# Patient Record
Sex: FEMALE | Race: WHITE | NOT HISPANIC OR LATINO | Employment: FULL TIME | URBAN - METROPOLITAN AREA
[De-identification: names, ages, dates, MRNs, and addresses within clinical notes are randomized per-mention and may not be internally consistent; named-entity substitution may affect disease eponyms.]

---

## 2017-01-16 ENCOUNTER — OFFICE VISIT (OUTPATIENT)
Dept: FAMILY MEDICINE CLINIC | Facility: CLINIC | Age: 62
End: 2017-01-16

## 2017-01-16 VITALS
HEART RATE: 89 BPM | DIASTOLIC BLOOD PRESSURE: 76 MMHG | BODY MASS INDEX: 30.51 KG/M2 | SYSTOLIC BLOOD PRESSURE: 140 MMHG | HEIGHT: 63 IN | OXYGEN SATURATION: 92 % | RESPIRATION RATE: 18 BRPM | TEMPERATURE: 98 F | WEIGHT: 172.2 LBS

## 2017-01-16 DIAGNOSIS — J20.9 ACUTE BRONCHITIS, UNSPECIFIED ORGANISM: Primary | ICD-10-CM

## 2017-01-16 DIAGNOSIS — J06.9 ACUTE URI: ICD-10-CM

## 2017-01-16 PROCEDURE — 99213 OFFICE O/P EST LOW 20 MIN: CPT | Performed by: NURSE PRACTITIONER

## 2017-01-16 RX ORDER — DEXTROMETHORPHAN HYDROBROMIDE AND PROMETHAZINE HYDROCHLORIDE 15; 6.25 MG/5ML; MG/5ML
5 SYRUP ORAL 4 TIMES DAILY PRN
Qty: 180 ML | Refills: 0 | Status: SHIPPED | OUTPATIENT
Start: 2017-01-16 | End: 2018-05-09

## 2017-01-16 RX ORDER — METHYLPREDNISOLONE 4 MG/1
TABLET ORAL
Qty: 21 TABLET | Refills: 0 | Status: SHIPPED | OUTPATIENT
Start: 2017-01-16 | End: 2018-05-09

## 2017-01-16 RX ORDER — ALBUTEROL SULFATE 90 UG/1
2 AEROSOL, METERED RESPIRATORY (INHALATION) EVERY 4 HOURS PRN
Qty: 1 INHALER | Refills: 6 | Status: SHIPPED | OUTPATIENT
Start: 2017-01-16 | End: 2018-09-17

## 2017-01-16 RX ORDER — AZITHROMYCIN 250 MG/1
TABLET, FILM COATED ORAL
Qty: 6 TABLET | Refills: 0 | Status: SHIPPED | OUTPATIENT
Start: 2017-01-16 | End: 2018-05-09

## 2017-01-16 NOTE — PROGRESS NOTES
Subjective   Kathleen William is a 61 y.o. female.   Chief Complaint   Patient presents with   • URI     since saturday sunday , PT STATES PROGRESSIVELY GETTING WORSE    • Cough     since friday      Vitals:    01/16/17 1057   BP: 140/76   Pulse: 89   Resp: 18   Temp: 98 °F (36.7 °C)   SpO2: 92%     No LMP recorded.    History of Present Illness  Kathleen is a patient Of Dr Chi who is here for an an acute visit. She c/o chest congestion with small amounts of sputum, wheezing, nasal congestion and post nasal drainage for 2 days. She did get her flu vaccine. She denies fever, chills or body aches. She has taken otc medications with little relief.     The following portions of the patient's history were reviewed and updated as appropriate: allergies, current medications, past family history, past medical history, past social history, past surgical history and problem list.    Review of Systems   Constitutional: Negative for chills, fatigue and fever.   HENT: Positive for congestion. Negative for ear pain, postnasal drip, rhinorrhea, sinus pressure and sore throat.    Respiratory: Positive for cough and wheezing. Negative for chest tightness and shortness of breath.    Cardiovascular: Negative.        Objective   Physical Exam   Constitutional: Vital signs are normal. She appears well-developed and well-nourished. She appears ill. No distress.   HENT:   Right Ear: Tympanic membrane and ear canal normal.   Left Ear: Tympanic membrane and ear canal normal.   Nose: Mucosal edema and rhinorrhea present.   Mouth/Throat: Posterior oropharyngeal erythema (post nasal drainage noted in the posterior pharynx ) present.   Cardiovascular: Normal rate, regular rhythm and normal heart sounds.    Pulmonary/Chest: Effort normal. She has wheezes (few scattered expiratory wheezes, posteriorly ). She has no rhonchi. She has no rales.   Neurological: She is alert.   Skin: Skin is warm and dry.       Assessment/Plan   Kathleen was seen  today for uri and cough.    Diagnoses and all orders for this visit:    Acute bronchitis, unspecified organism    Acute URI    Other orders  -     MethylPREDNISolone (MEDROL, PRAKASH,) 4 MG tablet; Take as directed on package instructions.  -     azithromycin (ZITHROMAX Z-PRAKASH) 250 MG tablet; Take 2 tablets the first day, then 1 tablet daily for 4 days.  -     albuterol (PROAIR HFA) 108 (90 BASE) MCG/ACT inhaler; Inhale 2 puffs Every 4 (Four) Hours As Needed for wheezing or shortness of air.  -     promethazine-dextromethorphan (PROMETHAZINE-DM) 6.25-15 MG/5ML syrup; Take 5 mL by mouth 4 (Four) Times a Day As Needed for cough.    Symptom treatment for 7-10 days, I gave her a zpak to hold on to incase her symptoms persisted or worsened.   Refilled her albuterol  Rest and fluids  Tylenol or motrin   Avoid second hand smoke and allergens   No driving while taking promethazine DM  Throat lozenges, humidifier, vicks vapor rub as needed  Follow up if your symptoms persist or sooner if your symptoms worsen or if you develop new symptoms

## 2017-01-16 NOTE — MR AVS SNAPSHOT
Kathleen Lisatyra   1/16/2017 11:00 AM   Office Visit    Dept Phone:  [phone]   Encounter #:  [id number]    Provider:  AUDRA Greenberg   Department:  [facility]                Your Full Care Plan              Today's Medication Changes          These changes are accurate as of: 1/16/17 11:10 AM.  If you have any questions, ask your nurse or doctor.               New Medication(s)Ordered:     azithromycin 250 MG tablet   Commonly known as:  ZITHROMAX Z-PRAKASH   Take 2 tablets the first day, then 1 tablet daily for 4 days.   Started by:  AUDRA Greenberg       MethylPREDNISolone 4 MG tablet   Commonly known as:  MEDROL (PRAKASH)   Take as directed on package instructions.   Started by:  AUDRA Greenberg       promethazine-dextromethorphan 6.25-15 MG/5ML syrup   Commonly known as:  PROMETHAZINE-DM   Take 5 mL by mouth 4 (Four) Times a Day As Needed for cough.   Started by:  AUDRA Greenberg         Stop taking medication(s)listed here:     FLUVIRIN suspension injection   Generic drug:  Influenza Vac Typ A&B Surf Ant   Stopped by:  Lorenza Ulloa MA                Where to Get Your Medications      These medications were sent to [facility]/pharmacy #[id number] [place], [place] - [address] [address] AT [address] - [phone]  - [phone]   [address] [address], [facility] [address]     Phone:  [phone]     albuterol 108 (90 BASE) MCG/ACT inhaler    MethylPREDNISolone 4 MG tablet    promethazine-dextromethorphan 6.25-15 MG/5ML syrup         You can get these medications from any pharmacy     Bring a paper prescription for each of these medications     azithromycin 250 MG tablet                  Your Updated Medication List          This list is accurate as of: 1/16/17 11:10 AM.  Always use your most recent med list.                albuterol 108 (90 BASE) MCG/ACT inhaler   Commonly known as:  PROAIR HFA   Inhale  2 puffs Every 4 (Four) Hours As Needed for wheezing or shortness of air.       azithromycin 250 MG tablet   Commonly known as:  ZITHROMAX Z-PRAKASH   Take 2 tablets the first day, then 1 tablet daily for 4 days.       MethylPREDNISolone 4 MG tablet   Commonly known as:  MEDROL (PRAKASH)   Take as directed on package instructions.       promethazine-dextromethorphan 6.25-15 MG/5ML syrup   Commonly known as:  PROMETHAZINE-DM   Take 5 mL by mouth 4 (Four) Times a Day As Needed for cough.       rosuvastatin 40 MG tablet   Commonly known as:  CRESTOR   Take 1 tablet by mouth Daily.       SYNTHROID 150 MCG tablet   Generic drug:  levothyroxine   Take 1 tablet by mouth Daily.       traMADol 50 MG tablet   Commonly known as:  ULTRAM       varenicline 0.5 MG X 11 & 1 MG X 42 tablet   Commonly known as:  CHANTIX STARTING MONTH PRAKASH   Take 0.5 mg one daily on days 1-2 and 0.5 mg twice daily on days 4-7. Then 1 mg twice daily for a total of 12 weeks.               You Were Diagnosed With        Codes Comments    Acute bronchitis, unspecified organism    -  Primary ICD-10-CM: J20.9  ICD-9-CM: 466.0     Acute URI     ICD-10-CM: J06.9  ICD-9-CM: 465.9       Instructions     None    Patient Instructions History      Upcoming Appointments     Visit Type Date Time Department    OFFICE VISIT 2017 11:00 AM [facility]    [facility] 2017 11:00 AM [facility]    OFFICE VISIT 2017  1:15 PM [facility]     [employer] allows you to send messages to your doctor, view your test results, renew your prescriptions, schedule appointments, and more. To sign up, go to [employer] and click on the Sign Up Now link in the New User? box. Enter your [employer] Activation Code exactly as it appears below along with the last four digits of your Social Security Number and your Date of Birth () to complete the sign-up process. If you do not sign up before the expiration date, you must  "request a new code.    [employer] Activation Code: [email or web address]  Expires: 1/30/2017 11:10 AM    If you have questions, you can email [email or web address] or call [phone] to talk to our [employer] staff. Remember, [employer] is NOT to be used for urgent needs. For medical emergencies, dial 911.               Other Info from Your Visit           Your Appointments     Apr 21, 2017 11:00 AM EDT   [facility] with [facility] (--)    [address] [address] [address] [phone]            Apr 28, 2017  1:15 PM EDT   Office Visit with Bay Chi MD   [facility] (--)    [address] [address] [address] [phone]           Arrive 15 minutes prior to appointment.              Allergies     Codeine        Reason for Visit     URI since saturday sunday , PT STATES PROGRESSIVELY GETTING WORSE     Cough since friday       Vital Signs     Blood Pressure Pulse Temperature Respirations Height Weight    140/76 89 98 °F (36.7 °C) (Oral) 18 63\" (160 cm) 172 lb 3.2 oz (78.1 kg)    Oxygen Saturation Body Mass Index Smoking Status             92% 30.5 kg/m2 Current Every Day Smoker         Problems and Diagnoses Noted     Acute bronchitis    -  Primary    Acute upper respiratory infection            "

## 2017-04-13 DIAGNOSIS — Z11.59 NEED FOR HEPATITIS C SCREENING TEST: Primary | ICD-10-CM

## 2017-04-13 DIAGNOSIS — E78.5 HYPERLIPIDEMIA, UNSPECIFIED HYPERLIPIDEMIA TYPE: ICD-10-CM

## 2017-04-13 DIAGNOSIS — R73.9 HYPERGLYCEMIA: ICD-10-CM

## 2017-04-13 DIAGNOSIS — E03.9 ACQUIRED HYPOTHYROIDISM: ICD-10-CM

## 2018-05-09 ENCOUNTER — APPOINTMENT (OUTPATIENT)
Dept: CT IMAGING | Facility: HOSPITAL | Age: 63
End: 2018-05-09

## 2018-05-09 ENCOUNTER — HOSPITAL ENCOUNTER (EMERGENCY)
Facility: HOSPITAL | Age: 63
Discharge: HOME | End: 2018-05-09
Attending: EMERGENCY MEDICINE | Admitting: EMERGENCY MEDICINE

## 2018-05-09 VITALS
RESPIRATION RATE: 16 BRPM | HEART RATE: 70 BPM | DIASTOLIC BLOOD PRESSURE: 68 MMHG | SYSTOLIC BLOOD PRESSURE: 129 MMHG | BODY MASS INDEX: 27.46 KG/M2 | OXYGEN SATURATION: 92 % | HEIGHT: 63 IN | WEIGHT: 155 LBS | TEMPERATURE: 97.9 F

## 2018-05-09 DIAGNOSIS — N20.0 KIDNEY STONE: Primary | ICD-10-CM

## 2018-05-09 LAB
ALBUMIN SERPL-MCNC: 4.5 G/DL (ref 3.5–5.2)
ALBUMIN/GLOB SERPL: 2 G/DL
ALP SERPL-CCNC: 78 U/L (ref 39–117)
ALT SERPL W P-5'-P-CCNC: 31 U/L (ref 1–33)
ANION GAP SERPL CALCULATED.3IONS-SCNC: 14.1 MMOL/L
AST SERPL-CCNC: 29 U/L (ref 1–32)
BACTERIA UR QL AUTO: ABNORMAL /HPF
BASOPHILS # BLD AUTO: 0.06 10*3/MM3 (ref 0–0.2)
BASOPHILS NFR BLD AUTO: 0.6 % (ref 0–1.5)
BILIRUB SERPL-MCNC: 0.3 MG/DL (ref 0.1–1.2)
BILIRUB UR QL STRIP: NEGATIVE
BUN BLD-MCNC: 13 MG/DL (ref 8–23)
BUN/CREAT SERPL: 20 (ref 7–25)
CALCIUM SPEC-SCNC: 9.5 MG/DL (ref 8.6–10.5)
CHLORIDE SERPL-SCNC: 105 MMOL/L (ref 98–107)
CLARITY UR: ABNORMAL
CO2 SERPL-SCNC: 24.9 MMOL/L (ref 22–29)
COLOR UR: ABNORMAL
CREAT BLD-MCNC: 0.65 MG/DL (ref 0.57–1)
DEPRECATED RDW RBC AUTO: 45.1 FL (ref 37–54)
EOSINOPHIL # BLD AUTO: 0.14 10*3/MM3 (ref 0–0.7)
EOSINOPHIL NFR BLD AUTO: 1.3 % (ref 0.3–6.2)
ERYTHROCYTE [DISTWIDTH] IN BLOOD BY AUTOMATED COUNT: 13 % (ref 11.7–13)
GFR SERPL CREATININE-BSD FRML MDRD: 93 ML/MIN/1.73
GLOBULIN UR ELPH-MCNC: 2.2 GM/DL
GLUCOSE BLD-MCNC: 123 MG/DL (ref 65–99)
GLUCOSE UR STRIP-MCNC: NEGATIVE MG/DL
HCT VFR BLD AUTO: 49.2 % (ref 35.6–45.5)
HGB BLD-MCNC: 16.4 G/DL (ref 11.9–15.5)
HGB UR QL STRIP.AUTO: ABNORMAL
HYALINE CASTS UR QL AUTO: ABNORMAL /LPF
IMM GRANULOCYTES # BLD: 0.02 10*3/MM3 (ref 0–0.03)
IMM GRANULOCYTES NFR BLD: 0.2 % (ref 0–0.5)
INR PPP: 1 (ref 0.9–1.1)
KETONES UR QL STRIP: NEGATIVE
LEUKOCYTE ESTERASE UR QL STRIP.AUTO: ABNORMAL
LYMPHOCYTES # BLD AUTO: 3.45 10*3/MM3 (ref 0.9–4.8)
LYMPHOCYTES NFR BLD AUTO: 31.9 % (ref 19.6–45.3)
MCH RBC QN AUTO: 31.8 PG (ref 26.9–32)
MCHC RBC AUTO-ENTMCNC: 33.3 G/DL (ref 32.4–36.3)
MCV RBC AUTO: 95.5 FL (ref 80.5–98.2)
MONOCYTES # BLD AUTO: 0.67 10*3/MM3 (ref 0.2–1.2)
MONOCYTES NFR BLD AUTO: 6.2 % (ref 5–12)
NEUTROPHILS # BLD AUTO: 6.47 10*3/MM3 (ref 1.9–8.1)
NEUTROPHILS NFR BLD AUTO: 59.8 % (ref 42.7–76)
NITRITE UR QL STRIP: NEGATIVE
PH UR STRIP.AUTO: <=5 [PH] (ref 5–8)
PLATELET # BLD AUTO: 241 10*3/MM3 (ref 140–500)
PMV BLD AUTO: 11.1 FL (ref 6–12)
POTASSIUM BLD-SCNC: 3.6 MMOL/L (ref 3.5–5.2)
PROT SERPL-MCNC: 6.7 G/DL (ref 6–8.5)
PROT UR QL STRIP: ABNORMAL
PROTHROMBIN TIME: 13 SECONDS (ref 11.7–14.2)
RBC # BLD AUTO: 5.15 10*6/MM3 (ref 3.9–5.2)
RBC # UR: ABNORMAL /HPF
REF LAB TEST METHOD: ABNORMAL
SODIUM BLD-SCNC: 144 MMOL/L (ref 136–145)
SP GR UR STRIP: 1.02 (ref 1–1.03)
SQUAMOUS #/AREA URNS HPF: ABNORMAL /HPF
UROBILINOGEN UR QL STRIP: ABNORMAL
WBC NRBC COR # BLD: 10.81 10*3/MM3 (ref 4.5–10.7)
WBC UR QL AUTO: ABNORMAL /HPF

## 2018-05-09 PROCEDURE — 85610 PROTHROMBIN TIME: CPT | Performed by: EMERGENCY MEDICINE

## 2018-05-09 PROCEDURE — 99284 EMERGENCY DEPT VISIT MOD MDM: CPT

## 2018-05-09 PROCEDURE — 25010000002 ONDANSETRON PER 1 MG: Performed by: EMERGENCY MEDICINE

## 2018-05-09 PROCEDURE — 85025 COMPLETE CBC W/AUTO DIFF WBC: CPT | Performed by: EMERGENCY MEDICINE

## 2018-05-09 PROCEDURE — 96375 TX/PRO/DX INJ NEW DRUG ADDON: CPT

## 2018-05-09 PROCEDURE — 80053 COMPREHEN METABOLIC PANEL: CPT | Performed by: EMERGENCY MEDICINE

## 2018-05-09 PROCEDURE — 87086 URINE CULTURE/COLONY COUNT: CPT | Performed by: EMERGENCY MEDICINE

## 2018-05-09 PROCEDURE — 96361 HYDRATE IV INFUSION ADD-ON: CPT

## 2018-05-09 PROCEDURE — 25010000002 HYDROMORPHONE PER 4 MG: Performed by: EMERGENCY MEDICINE

## 2018-05-09 PROCEDURE — 96374 THER/PROPH/DIAG INJ IV PUSH: CPT

## 2018-05-09 PROCEDURE — 81001 URINALYSIS AUTO W/SCOPE: CPT | Performed by: EMERGENCY MEDICINE

## 2018-05-09 PROCEDURE — 74176 CT ABD & PELVIS W/O CONTRAST: CPT

## 2018-05-09 RX ORDER — HYDROCODONE BITARTRATE AND ACETAMINOPHEN 5; 325 MG/1; MG/1
1 TABLET ORAL EVERY 4 HOURS PRN
Qty: 15 TABLET | Refills: 0 | Status: SHIPPED | OUTPATIENT
Start: 2018-05-09 | End: 2018-09-17

## 2018-05-09 RX ORDER — HYDROMORPHONE HYDROCHLORIDE 1 MG/ML
0.5 INJECTION, SOLUTION INTRAMUSCULAR; INTRAVENOUS; SUBCUTANEOUS ONCE
Status: COMPLETED | OUTPATIENT
Start: 2018-05-09 | End: 2018-05-09

## 2018-05-09 RX ORDER — ONDANSETRON 4 MG/1
4 TABLET, FILM COATED ORAL EVERY 8 HOURS PRN
Qty: 10 TABLET | Refills: 0 | Status: SHIPPED | OUTPATIENT
Start: 2018-05-09 | End: 2018-09-17

## 2018-05-09 RX ORDER — ONDANSETRON 2 MG/ML
4 INJECTION INTRAMUSCULAR; INTRAVENOUS ONCE
Status: COMPLETED | OUTPATIENT
Start: 2018-05-09 | End: 2018-05-09

## 2018-05-09 RX ORDER — SODIUM CHLORIDE 0.9 % (FLUSH) 0.9 %
10 SYRINGE (ML) INJECTION AS NEEDED
Status: DISCONTINUED | OUTPATIENT
Start: 2018-05-09 | End: 2018-05-09 | Stop reason: HOSPADM

## 2018-05-09 RX ORDER — TAMSULOSIN HYDROCHLORIDE 0.4 MG/1
1 CAPSULE ORAL NIGHTLY
Qty: 10 CAPSULE | Refills: 0 | Status: SHIPPED | OUTPATIENT
Start: 2018-05-09 | End: 2018-09-17

## 2018-05-09 RX ORDER — SODIUM CHLORIDE 9 MG/ML
125 INJECTION, SOLUTION INTRAVENOUS CONTINUOUS
Status: DISCONTINUED | OUTPATIENT
Start: 2018-05-09 | End: 2018-05-09 | Stop reason: HOSPADM

## 2018-05-09 RX ADMIN — ONDANSETRON 4 MG: 2 INJECTION INTRAMUSCULAR; INTRAVENOUS at 02:13

## 2018-05-09 RX ADMIN — SODIUM CHLORIDE 125 ML/HR: 9 INJECTION, SOLUTION INTRAVENOUS at 03:05

## 2018-05-09 RX ADMIN — HYDROMORPHONE HYDROCHLORIDE 0.5 MG: 1 INJECTION, SOLUTION INTRAMUSCULAR; INTRAVENOUS; SUBCUTANEOUS at 02:16

## 2018-05-09 NOTE — ED PROVIDER NOTES
EMERGENCY DEPARTMENT ENCOUNTER    CHIEF COMPLAINT  Chief Complaint: Abdominal pain  History given by: patient   History limited by: n/a  Room Number: 04/04  PMD: Amairani Moura MD      HPI:  Pt is a 62 y.o. female who presents complaining of RLQ abd pain that began tonight. Pt states that she had mild pain prior to going to bed, but awoke with more severe pain. Pt states that the pain radiates into her back and is worsened by movement. She denies urinary sx, fever, chills, or any other sx. Hx of cholecystectomy.     Duration:  6 hours  Onset: gradual  Timing: constant  Location: RLQ  Radiation: into the back   Quality: pain  Intensity/Severity: moderate  Progression: worsened   Associated Symptoms: none  Aggravating Factors: movement   Alleviating Factors: none  Previous Episodes: none  Treatment before arrival: none    PAST MEDICAL HISTORY  Active Ambulatory Problems     Diagnosis Date Noted   • Disease of respiratory system 08/10/2016   • Hyperglycemia 08/10/2016   • Hyperlipidemia 08/10/2016   • Hypothyroidism 08/10/2016   • Abnormal LFTs (liver function tests) 08/12/2016   • Erythrocytosis 12/16/2016     Resolved Ambulatory Problems     Diagnosis Date Noted   • No Resolved Ambulatory Problems     Past Medical History:   Diagnosis Date   • Disease of thyroid gland    • Edema    • Hyperlipidemia    • Neck pain        PAST SURGICAL HISTORY  Past Surgical History:   Procedure Laterality Date   • TOE SURGERY         FAMILY HISTORY  History reviewed. No pertinent family history.    SOCIAL HISTORY  Social History     Social History   • Marital status: Unknown     Spouse name: N/A   • Number of children: N/A   • Years of education: N/A     Occupational History   • Not on file.     Social History Main Topics   • Smoking status: Current Every Day Smoker     Packs/day: 1.50   • Smokeless tobacco: Not on file   • Alcohol use Yes   • Drug use: Unknown   • Sexual activity: Defer     Other Topics Concern   • Not on file      Social History Narrative   • No narrative on file       ALLERGIES  Codeine    REVIEW OF SYSTEMS  Review of Systems   Constitutional: Negative for fever.   HENT: Negative for sore throat.    Eyes: Negative.    Respiratory: Negative for cough and shortness of breath.    Cardiovascular: Negative for chest pain.   Gastrointestinal: Positive for abdominal pain. Negative for diarrhea and vomiting.   Genitourinary: Negative for dysuria.   Musculoskeletal: Negative for neck pain.   Skin: Negative for rash.   Allergic/Immunologic: Negative.    Neurological: Negative for weakness, numbness and headaches.   Hematological: Negative.    Psychiatric/Behavioral: Negative.    All other systems reviewed and are negative.      PHYSICAL EXAM  ED Triage Vitals [05/09/18 0147]   Temp Heart Rate Resp BP SpO2   97.9 °F (36.6 °C) 77 20 -- 93 %      Temp src Heart Rate Source Patient Position BP Location FiO2 (%)   -- -- -- -- --       Physical Exam   Constitutional: She is oriented to person, place, and time and well-developed, well-nourished, and in no distress. No distress.   HENT:   Head: Normocephalic and atraumatic.   Eyes: EOM are normal. Pupils are equal, round, and reactive to light.   Neck: Normal range of motion. Neck supple.   Cardiovascular: Normal rate, regular rhythm and normal heart sounds.    Pulmonary/Chest: Effort normal and breath sounds normal. No respiratory distress.   Abdominal: Soft. There is tenderness (marked) in the right lower quadrant. There is guarding (voluntary). There is no rebound.   Increased pain with heel tap.   Musculoskeletal: Normal range of motion. She exhibits no edema.   Neurological: She is alert and oriented to person, place, and time.   Skin: Skin is warm and dry. No rash noted.   Psychiatric: Mood and affect normal.   Nursing note and vitals reviewed.      LAB RESULTS  Lab Results (last 24 hours)     Procedure Component Value Units Date/Time    CBC & Differential [[id number]] Collected:   05/09/18 0209    Specimen:  Blood Updated:  05/09/18 0220    Narrative:       The following orders were created for panel order CBC & Differential.  Procedure                               Abnormality         Status                     ---------                               -----------         ------                     CBC Auto Differential[[id number]]        Abnormal            Final result                 Please view results for these tests on the individual orders.    Comprehensive Metabolic Panel [[id number]]  (Abnormal) Collected:  05/09/18 0209    Specimen:  Blood Updated:  05/09/18 0240     Glucose 123 (H) mg/dL      BUN 13 mg/dL      Creatinine 0.65 mg/dL      Sodium 144 mmol/L      Potassium 3.6 mmol/L      Chloride 105 mmol/L      CO2 24.9 mmol/L      Calcium 9.5 mg/dL      Total Protein 6.7 g/dL      Albumin 4.50 g/dL      ALT (SGPT) 31 U/L      AST (SGOT) 29 U/L      Alkaline Phosphatase 78 U/L      Total Bilirubin 0.3 mg/dL      eGFR Non African Amer 93 mL/min/1.73      Globulin 2.2 gm/dL      A/G Ratio 2.0 g/dL      BUN/Creatinine Ratio 20.0     Anion Gap 14.1 mmol/L     Protime-INR [[id number]]  (Normal) Collected:  05/09/18 0209    Specimen:  Blood Updated:  05/09/18 0229     Protime 13.0 Seconds      INR 1.00    CBC Auto Differential [[id number]]  (Abnormal) Collected:  05/09/18 0209    Specimen:  Blood Updated:  05/09/18 0220     WBC 10.81 (H) 10*3/mm3      RBC 5.15 10*6/mm3      Hemoglobin 16.4 (H) g/dL      Hematocrit 49.2 (H) %      MCV 95.5 fL      MCH 31.8 pg      MCHC 33.3 g/dL      RDW 13.0 %      RDW-SD 45.1 fl      MPV 11.1 fL      Platelets 241 10*3/mm3      Neutrophil % 59.8 %      Lymphocyte % 31.9 %      Monocyte % 6.2 %      Eosinophil % 1.3 %      Basophil % 0.6 %      Immature Grans % 0.2 %      Neutrophils, Absolute 6.47 10*3/mm3      Lymphocytes, Absolute 3.45 10*3/mm3      Monocytes, Absolute 0.67 10*3/mm3      Eosinophils, Absolute 0.14 10*3/mm3      Basophils, Absolute 0.06  10*3/mm3      Immature Grans, Absolute 0.02 10*3/mm3     Urinalysis With / Culture If Indicated - Urine, Clean Catch [[id number]]  (Abnormal) Collected:  05/09/18 0315    Specimen:  Urine from Urine, Clean Catch Updated:  05/09/18 0335     Color, UA Zuri (A)     Appearance, UA Cloudy (A)     pH, UA <=5.0     Specific Gravity, UA 1.025     Glucose, UA Negative     Ketones, UA Negative     Bilirubin, UA Negative     Blood, UA Large (3+) (A)     Protein, UA 30 mg/dL (1+) (A)     Leuk Esterase, UA Small (1+) (A)     Nitrite, UA Negative     Urobilinogen, UA 1.0 E.U./dL    Urinalysis, Microscopic Only - Urine, Clean Catch [[id number]]  (Abnormal) Collected:  05/09/18 0315    Specimen:  Urine from Urine, Clean Catch Updated:  05/09/18 0336     RBC, UA Too Numerous to Count (A) /HPF      WBC, UA 6-12 (A) /HPF      Bacteria, UA None Seen /HPF      Squamous Epithelial Cells, UA 0-2 /HPF      Hyaline Casts, UA 0-2 /LPF      Methodology Automated Microscopy    Urine Culture - Urine, Urine, Clean Catch [[id number]] Collected:  05/09/18 0315    Specimen:  Urine from Urine, Clean Catch Updated:  05/09/18 0336          I ordered the above labs and reviewed the results    RADIOLOGY  CT Abdomen Pelvis Without Contrast   Preliminary Result   0.4 cm stone obstructs the right UVJ, mild right hydronephrosis and   hydroureter.                          I ordered the above noted radiological studies. Interpreted by radiologist.. Reviewed by me in PACS.       PROCEDURES  Procedures      PROGRESS AND CONSULTS  ED Course     01:54   HR- 77 Temp- 97.9 °F (36.6 °C) O2 sat- 93%  Advised pt of the plan for labs and CT abd/pelvis. Will treat pain. Pt understands and agrees with the plan, all questions answered.    01:56  IVF ordered for hydration. Dilaudid and Zofran ordered to treat pain. CT abd/pelvis, CMP, CBC, PT/INR, and UA ordered.     03;26  BP- 144/96 HR- 77 Temp- 97.9 °F (36.6 °C) O2 sat- 93%  Rechecked the patient who is in NAD and is  resting comfortably. Advised pt that the CT shows a 4 mm right UVJ stone. Awaiting UA results. Pt understands and agrees with the plan, all questions answered.    04:00  BP- 144/96 HR- 77 Temp- 97.9 °F (36.6 °C) O2 sat- 93%  Rechecked the patient who is in NAD and is resting comfortably. Advised pt that the UA shows RBC's, but no UTI. Plan for discharge with rx for pain medication and referral to urology for f/u. Pt understands and agrees with the plan, all questions answered.    MEDICAL DECISION MAKING  Results were reviewed/discussed with the patient and they were also made aware of online access. Pt also made aware that some labs, such as cultures, will not be resulted during ER visit and follow up with PMD is necessary.     MDM  Number of Diagnoses or Management Options     Amount and/or Complexity of Data Reviewed  Clinical lab tests: ordered and reviewed (Urinalysis- RBC too numerous to count, WBC 6-12, Bacteria none seen  )  Tests in the radiology section of CPT®: ordered and reviewed (CT abd/pelvis shows 4 mm right UVJ stone)    Patient Progress  Patient progress: stable         DIAGNOSIS  Final diagnoses:   Kidney stone       DISPOSITION  DISCHARGE    Patient discharged in stable condition.    Reviewed implications of results, diagnosis, meds, responsibility to follow up, warning signs and symptoms of possible worsening, potential complications and reasons to return to ER.    Patient/Family voiced understanding of above instructions.    Discussed plan for discharge, as there is no emergent indication for admission. Patient referred to primary care provider for BP management due to today's BP. Pt/family is agreeable and understands need for follow up and repeat testing.  Pt is aware that discharge does not mean that nothing is wrong but it indicates no emergency is present that requires admission and they must continue care with follow-up as given below or physician of their choice.     FOLLOW-UP  [facility]  [address] [facility] [address]  [phone]  Schedule an appointment as soon as possible for a visit            Medication List      New Prescriptions    HYDROcodone-acetaminophen 5-325 MG per tablet  Commonly known as:  NORCO  Take 1 tablet by mouth Every 4 (Four) Hours As Needed for Moderate Pain .     ondansetron 4 MG tablet  Commonly known as:  ZOFRAN  Take 1 tablet by mouth Every 8 (Eight) Hours As Needed for Nausea.     tamsulosin 0.4 MG capsule 24 hr capsule  Commonly known as:  FLOMAX  Take 1 capsule by mouth Every Night.        Stop    azithromycin 250 MG tablet  Commonly known as:  ZITHROMAX Z-PRAKASH     MethylPREDNISolone 4 MG tablet  Commonly known as:  MEDROL (PRAKASH)     promethazine-dextromethorphan 6.25-15 MG/5ML syrup  Commonly known as:  PROMETHAZINE-DM     traMADol 50 MG tablet  Commonly known as:  ULTRAM          Latest Documented Vital Signs:  As of 4:01 AM  BP- 144/96 HR- 77 Temp- 97.9 °F (36.6 °C) O2 sat- 93%    --  Documentation assistance provided by steve Littlejohn for Dr Borrego.  Information recorded by the steve was done at my direction and has been verified and validated by me.       Ayse Littlejohn  05/09/18 0401       Juan Borrego MD  05/09/18 5051

## 2018-05-09 NOTE — ED NOTES
"Pt to bed 04 c/o RLQ abdominal pain. reports pain began today, states \"it woke me up\" reports nausea but denies vomiting. Describes pain as sharp. Pt reports LBM 5/8/18 around 3pm. States \"i tried to go this morning to see if it helped with pain\"    Reports she took 800 mg ibuprofen at home without improvement.       Clarissa Russ RN  05/09/18 0230    "

## 2018-05-10 LAB — BACTERIA SPEC AEROBE CULT: NO GROWTH

## 2018-09-17 ENCOUNTER — APPOINTMENT (OUTPATIENT)
Dept: GENERAL RADIOLOGY | Facility: HOSPITAL | Age: 63
End: 2018-09-17

## 2018-09-17 ENCOUNTER — HOSPITAL ENCOUNTER (INPATIENT)
Facility: HOSPITAL | Age: 63
LOS: 3 days | Discharge: HOME | End: 2018-09-20
Attending: EMERGENCY MEDICINE | Admitting: HOSPITALIST

## 2018-09-17 DIAGNOSIS — J96.01 ACUTE RESPIRATORY FAILURE WITH HYPOXIA (HCC): ICD-10-CM

## 2018-09-17 DIAGNOSIS — G47.33 OSA (OBSTRUCTIVE SLEEP APNEA): ICD-10-CM

## 2018-09-17 DIAGNOSIS — J44.1 COPD EXACERBATION (HCC): Primary | ICD-10-CM

## 2018-09-17 LAB
ALBUMIN SERPL-MCNC: 5.1 G/DL (ref 3.5–5.2)
ALBUMIN/GLOB SERPL: 2.2 G/DL
ALP SERPL-CCNC: 104 U/L (ref 39–117)
ALT SERPL W P-5'-P-CCNC: 74 U/L (ref 1–33)
ANION GAP SERPL CALCULATED.3IONS-SCNC: 13.1 MMOL/L
AST SERPL-CCNC: 33 U/L (ref 1–32)
BASOPHILS # BLD AUTO: 0.01 10*3/MM3 (ref 0–0.2)
BASOPHILS NFR BLD AUTO: 0 % (ref 0–1.5)
BILIRUB SERPL-MCNC: 0.3 MG/DL (ref 0.1–1.2)
BUN BLD-MCNC: 16 MG/DL (ref 8–23)
BUN/CREAT SERPL: 26.2 (ref 7–25)
CALCIUM SPEC-SCNC: 9.5 MG/DL (ref 8.6–10.5)
CHLORIDE SERPL-SCNC: 99 MMOL/L (ref 98–107)
CO2 SERPL-SCNC: 24.9 MMOL/L (ref 22–29)
CREAT BLD-MCNC: 0.61 MG/DL (ref 0.57–1)
DEPRECATED RDW RBC AUTO: 46.8 FL (ref 37–54)
EOSINOPHIL # BLD AUTO: 0 10*3/MM3 (ref 0–0.7)
EOSINOPHIL NFR BLD AUTO: 0 % (ref 0.3–6.2)
ERYTHROCYTE [DISTWIDTH] IN BLOOD BY AUTOMATED COUNT: 13.8 % (ref 11.7–13)
GFR SERPL CREATININE-BSD FRML MDRD: 99 ML/MIN/1.73
GLOBULIN UR ELPH-MCNC: 2.3 GM/DL
GLUCOSE BLD-MCNC: 138 MG/DL (ref 65–99)
HCT VFR BLD AUTO: 48.3 % (ref 35.6–45.5)
HGB BLD-MCNC: 16.8 G/DL (ref 11.9–15.5)
HOLD SPECIMEN: NORMAL
HOLD SPECIMEN: NORMAL
IMM GRANULOCYTES # BLD: 0.08 10*3/MM3 (ref 0–0.03)
IMM GRANULOCYTES NFR BLD: 0.4 % (ref 0–0.5)
LYMPHOCYTES # BLD AUTO: 2.31 10*3/MM3 (ref 0.9–4.8)
LYMPHOCYTES NFR BLD AUTO: 10.1 % (ref 19.6–45.3)
MCH RBC QN AUTO: 32.2 PG (ref 26.9–32)
MCHC RBC AUTO-ENTMCNC: 34.8 G/DL (ref 32.4–36.3)
MCV RBC AUTO: 92.7 FL (ref 80.5–98.2)
MONOCYTES # BLD AUTO: 1.13 10*3/MM3 (ref 0.2–1.2)
MONOCYTES NFR BLD AUTO: 5 % (ref 5–12)
NEUTROPHILS # BLD AUTO: 19.33 10*3/MM3 (ref 1.9–8.1)
NEUTROPHILS NFR BLD AUTO: 84.9 % (ref 42.7–76)
NT-PROBNP SERPL-MCNC: 58 PG/ML (ref 5–900)
PLATELET # BLD AUTO: 334 10*3/MM3 (ref 140–500)
PMV BLD AUTO: 10.9 FL (ref 6–12)
POTASSIUM BLD-SCNC: 4.3 MMOL/L (ref 3.5–5.2)
PROCALCITONIN SERPL-MCNC: <0.02 NG/ML (ref 0.1–0.25)
PROT SERPL-MCNC: 7.4 G/DL (ref 6–8.5)
RBC # BLD AUTO: 5.21 10*6/MM3 (ref 3.9–5.2)
SODIUM BLD-SCNC: 137 MMOL/L (ref 136–145)
TROPONIN T SERPL-MCNC: <0.01 NG/ML (ref 0–0.03)
WBC NRBC COR # BLD: 22.78 10*3/MM3 (ref 4.5–10.7)
WHOLE BLOOD HOLD SPECIMEN: NORMAL
WHOLE BLOOD HOLD SPECIMEN: NORMAL

## 2018-09-17 PROCEDURE — 94799 UNLISTED PULMONARY SVC/PX: CPT

## 2018-09-17 PROCEDURE — 84484 ASSAY OF TROPONIN QUANT: CPT | Performed by: PHYSICIAN ASSISTANT

## 2018-09-17 PROCEDURE — 80053 COMPREHEN METABOLIC PANEL: CPT | Performed by: PHYSICIAN ASSISTANT

## 2018-09-17 PROCEDURE — 25010000002 HEPARIN (PORCINE) PER 1000 UNITS: Performed by: INTERNAL MEDICINE

## 2018-09-17 PROCEDURE — 94640 AIRWAY INHALATION TREATMENT: CPT

## 2018-09-17 PROCEDURE — 84145 PROCALCITONIN (PCT): CPT | Performed by: EMERGENCY MEDICINE

## 2018-09-17 PROCEDURE — 83880 ASSAY OF NATRIURETIC PEPTIDE: CPT | Performed by: PHYSICIAN ASSISTANT

## 2018-09-17 PROCEDURE — 93010 ELECTROCARDIOGRAM REPORT: CPT | Performed by: INTERNAL MEDICINE

## 2018-09-17 PROCEDURE — 99285 EMERGENCY DEPT VISIT HI MDM: CPT

## 2018-09-17 PROCEDURE — 93005 ELECTROCARDIOGRAM TRACING: CPT | Performed by: PHYSICIAN ASSISTANT

## 2018-09-17 PROCEDURE — 85025 COMPLETE CBC W/AUTO DIFF WBC: CPT | Performed by: PHYSICIAN ASSISTANT

## 2018-09-17 PROCEDURE — 71045 X-RAY EXAM CHEST 1 VIEW: CPT

## 2018-09-17 PROCEDURE — 25010000002 METHYLPREDNISOLONE PER 125 MG: Performed by: INTERNAL MEDICINE

## 2018-09-17 RX ORDER — IPRATROPIUM BROMIDE AND ALBUTEROL SULFATE 2.5; .5 MG/3ML; MG/3ML
3 SOLUTION RESPIRATORY (INHALATION)
Status: DISCONTINUED | OUTPATIENT
Start: 2018-09-17 | End: 2018-09-20 | Stop reason: HOSPADM

## 2018-09-17 RX ORDER — LEVOTHYROXINE SODIUM 0.15 MG/1
150 TABLET ORAL DAILY
COMMUNITY

## 2018-09-17 RX ORDER — IPRATROPIUM BROMIDE AND ALBUTEROL SULFATE 2.5; .5 MG/3ML; MG/3ML
3 SOLUTION RESPIRATORY (INHALATION) ONCE
Status: COMPLETED | OUTPATIENT
Start: 2018-09-17 | End: 2018-09-17

## 2018-09-17 RX ORDER — ALBUTEROL SULFATE 2.5 MG/3ML
2.5 SOLUTION RESPIRATORY (INHALATION) ONCE
Status: COMPLETED | OUTPATIENT
Start: 2018-09-17 | End: 2018-09-17

## 2018-09-17 RX ORDER — LEVOTHYROXINE SODIUM 0.15 MG/1
150 TABLET ORAL EVERY MORNING
Status: DISCONTINUED | OUTPATIENT
Start: 2018-09-18 | End: 2018-09-20 | Stop reason: HOSPADM

## 2018-09-17 RX ORDER — METHYLPREDNISOLONE SODIUM SUCCINATE 125 MG/2ML
80 INJECTION, POWDER, LYOPHILIZED, FOR SOLUTION INTRAMUSCULAR; INTRAVENOUS EVERY 8 HOURS
Status: DISCONTINUED | OUTPATIENT
Start: 2018-09-17 | End: 2018-09-19

## 2018-09-17 RX ORDER — HEPARIN SODIUM 5000 [USP'U]/ML
5000 INJECTION, SOLUTION INTRAVENOUS; SUBCUTANEOUS EVERY 12 HOURS SCHEDULED
Status: DISCONTINUED | OUTPATIENT
Start: 2018-09-17 | End: 2018-09-18

## 2018-09-17 RX ORDER — SODIUM CHLORIDE 0.9 % (FLUSH) 0.9 %
10 SYRINGE (ML) INJECTION AS NEEDED
Status: DISCONTINUED | OUTPATIENT
Start: 2018-09-17 | End: 2018-09-18

## 2018-09-17 RX ORDER — SODIUM CHLORIDE 0.9 % (FLUSH) 0.9 %
1-10 SYRINGE (ML) INJECTION AS NEEDED
Status: DISCONTINUED | OUTPATIENT
Start: 2018-09-17 | End: 2018-09-20 | Stop reason: HOSPADM

## 2018-09-17 RX ORDER — ACETAMINOPHEN 325 MG/1
650 TABLET ORAL EVERY 4 HOURS PRN
Status: DISCONTINUED | OUTPATIENT
Start: 2018-09-17 | End: 2018-09-20 | Stop reason: HOSPADM

## 2018-09-17 RX ORDER — ROSUVASTATIN CALCIUM 40 MG/1
40 TABLET, COATED ORAL DAILY
Status: DISCONTINUED | OUTPATIENT
Start: 2018-09-18 | End: 2018-09-20 | Stop reason: HOSPADM

## 2018-09-17 RX ORDER — ONDANSETRON 2 MG/ML
4 INJECTION INTRAMUSCULAR; INTRAVENOUS EVERY 6 HOURS PRN
Status: DISCONTINUED | OUTPATIENT
Start: 2018-09-17 | End: 2018-09-20 | Stop reason: HOSPADM

## 2018-09-17 RX ORDER — IBUPROFEN 800 MG/1
800 TABLET ORAL DAILY PRN
COMMUNITY
End: 2018-09-20 | Stop reason: HOSPADM

## 2018-09-17 RX ADMIN — ALBUTEROL SULFATE 2.5 MG: 2.5 SOLUTION RESPIRATORY (INHALATION) at 18:04

## 2018-09-17 RX ADMIN — IPRATROPIUM BROMIDE AND ALBUTEROL SULFATE 3 ML: .5; 3 SOLUTION RESPIRATORY (INHALATION) at 16:58

## 2018-09-17 RX ADMIN — ACETAMINOPHEN 650 MG: 325 TABLET ORAL at 22:28

## 2018-09-17 RX ADMIN — METHYLPREDNISOLONE SODIUM SUCCINATE 80 MG: 125 INJECTION, POWDER, FOR SOLUTION INTRAMUSCULAR; INTRAVENOUS at 23:57

## 2018-09-17 RX ADMIN — HEPARIN SODIUM 5000 UNITS: 5000 INJECTION INTRAVENOUS; SUBCUTANEOUS at 23:58

## 2018-09-17 RX ADMIN — IPRATROPIUM BROMIDE AND ALBUTEROL SULFATE 3 ML: .5; 3 SOLUTION RESPIRATORY (INHALATION) at 23:06

## 2018-09-17 NOTE — ED TRIAGE NOTES
Pt reports she has been soa for a week. Pt has seen her dr twice in a week and was given steroids and antibiotics that pt states is not helping.

## 2018-09-17 NOTE — ED PROVIDER NOTES
EMERGENCY DEPARTMENT ENCOUNTER    CHIEF COMPLAINT  Chief Complaint: SOA  History given by: Pt  History limited by: Nothing  Room Number: 18/18  PMD: Amairani Moura MD      HPI:  Pt is a 63 y.o. female with a h/o COPD who presents complaining of SOA that began 1.5 weeks ago. The pt states that she was working in a wet basement and then worked outside, and since then has felt SOA and has been coughing. The pt has had course of steroids and has had breathing treatment (albuterol with nebulizer) without significant improvement.     The pt states that she quit smoking when her symptoms began.     Duration:  1.5 weeks  Onset: Gradual  Timing: Constant  Location: Chest  Quality: Difficult to breathe  Intensity/Severity: Mdoerate  Progression: No change  Associated Symptoms: Cough   Aggravating Factors: Unknown  Alleviating Factors: Unknown  Treatment before arrival: The pt is on a steroid course and has used breathing treatments without relief.     PAST MEDICAL HISTORY  Active Ambulatory Problems     Diagnosis Date Noted   • Disease of respiratory system 08/10/2016   • Hyperglycemia 08/10/2016   • Hyperlipidemia 08/10/2016   • Hypothyroidism 08/10/2016   • Abnormal LFTs (liver function tests) 08/12/2016   • Erythrocytosis 12/16/2016     Resolved Ambulatory Problems     Diagnosis Date Noted   • No Resolved Ambulatory Problems     Past Medical History:   Diagnosis Date   • Back pain    • Disease of thyroid gland    • Edema    • Hyperlipidemia    • Neck pain        PAST SURGICAL HISTORY  Past Surgical History:   Procedure Laterality Date   • BREAST SURGERY     • CHOLECYSTECTOMY     • HERNIA REPAIR     • HYSTERECTOMY     • TOE SURGERY         FAMILY HISTORY  History reviewed. No pertinent family history.    SOCIAL HISTORY  Social History     Social History   • Marital status: Unknown     Spouse name: N/A   • Number of children: N/A   • Years of education: N/A     Occupational History   • Not on file.     Social History Main  Topics   • Smoking status: Current Every Day Smoker     Packs/day: 1.50   • Smokeless tobacco: Not on file   • Alcohol use No   • Drug use: Unknown   • Sexual activity: Defer     Other Topics Concern   • Not on file     Social History Narrative   • No narrative on file       ALLERGIES  Codeine    REVIEW OF SYSTEMS  Review of Systems   Constitutional: Negative for fever.   HENT: Negative for sore throat.    Eyes: Negative.    Respiratory: Positive for cough and shortness of breath.    Cardiovascular: Negative for chest pain.   Gastrointestinal: Negative for abdominal pain, diarrhea and vomiting.   Genitourinary: Negative for dysuria.   Musculoskeletal: Negative for neck pain.   Skin: Negative for rash.   Allergic/Immunologic: Negative.    Neurological: Negative for weakness, numbness and headaches.   Hematological: Negative.    Psychiatric/Behavioral: Negative.    All other systems reviewed and are negative.      PHYSICAL EXAM  ED Triage Vitals   Temp Heart Rate Resp BP SpO2   09/17/18 1610 09/17/18 1610 09/17/18 1634 09/17/18 1634 09/17/18 1610   98 °F (36.7 °C) 91 18 149/89 91 %      Temp src Heart Rate Source Patient Position BP Location FiO2 (%)   09/17/18 1610 09/17/18 1634 09/17/18 1634 09/17/18 1634 --   Tympanic Monitor Sitting Right arm        Physical Exam   Constitutional: She is oriented to person, place, and time. No distress.   HENT:   Head: Normocephalic and atraumatic.   Eyes: Pupils are equal, round, and reactive to light. EOM are normal.   Neck: Normal range of motion. Neck supple.   Cardiovascular: Normal rate, regular rhythm and normal heart sounds.    Pulmonary/Chest: She is in respiratory distress (Mild). She has wheezes in the right upper field, the right middle field, the right lower field, the left upper field, the left middle field and the left lower field.   Abdominal: Soft. There is no tenderness. There is no rebound and no guarding.   Musculoskeletal: Normal range of motion. She exhibits  no edema.   Neurological: She is alert and oriented to person, place, and time. She has normal sensation and normal strength.   Skin: Skin is warm and dry. No rash noted.   Psychiatric: Mood and affect normal.   Nursing note and vitals reviewed.      LAB RESULTS  Lab Results (last 24 hours)     Procedure Component Value Units Date/Time    BNP [[id number]]  (Normal) Collected:  09/17/18 1638    Specimen:  Blood Updated:  09/17/18 1721     proBNP 58.0 pg/mL     Narrative:       Among patients with dyspnea, NT-proBNP is highly sensitive for the detection of acute congestive heart failure. In addition NT-proBNP of <300 pg/ml effectively rules out acute congestive heart failure with 99% negative predictive value.    CBC & Differential [[id number]] Collected:  09/17/18 1638    Specimen:  Blood Updated:  09/17/18 1702    Narrative:       The following orders were created for panel order CBC & Differential.  Procedure                               Abnormality         Status                     ---------                               -----------         ------                     CBC Auto Differential[[id number]]        Abnormal            Final result                 Please view results for these tests on the individual orders.    Comprehensive Metabolic Panel [[id number]]  (Abnormal) Collected:  09/17/18 1638    Specimen:  Blood Updated:  09/17/18 1723     Glucose 138 (H) mg/dL      BUN 16 mg/dL      Creatinine 0.61 mg/dL      Sodium 137 mmol/L      Potassium 4.3 mmol/L      Chloride 99 mmol/L      CO2 24.9 mmol/L      Calcium 9.5 mg/dL      Total Protein 7.4 g/dL      Albumin 5.10 g/dL      ALT (SGPT) 74 (H) U/L      AST (SGOT) 33 (H) U/L      Alkaline Phosphatase 104 U/L      Total Bilirubin 0.3 mg/dL      eGFR Non African Amer 99 mL/min/1.73      Globulin 2.3 gm/dL      A/G Ratio 2.2 g/dL      BUN/Creatinine Ratio 26.2 (H)     Anion Gap 13.1 mmol/L     Troponin [[id number]]  (Normal) Collected:  09/17/18 1638    Specimen:   "Blood Updated:  09/17/18 1723     Troponin T <0.010 ng/mL     Narrative:       Troponin T Reference Ranges:  Less than 0.03 ng/mL:    Negative for AMI  0.03 to 0.09 ng/mL:      Indeterminant for AMI  Greater than 0.09 ng/mL: Positive for AMI    CBC Auto Differential [[id number]]  (Abnormal) Collected:  09/17/18 1638    Specimen:  Blood Updated:  09/17/18 1702     WBC 22.78 (H) 10*3/mm3      RBC 5.21 (H) 10*6/mm3      Hemoglobin 16.8 (H) g/dL      Hematocrit 48.3 (H) %      MCV 92.7 fL      MCH 32.2 (H) pg      MCHC 34.8 g/dL      RDW 13.8 (H) %      RDW-SD 46.8 fl      MPV 10.9 fL      Platelets 334 10*3/mm3      Neutrophil % 84.9 (H) %      Lymphocyte % 10.1 (L) %      Monocyte % 5.0 %      Eosinophil % 0.0 (L) %      Basophil % 0.0 %      Immature Grans % 0.4 %      Neutrophils, Absolute 19.33 (H) 10*3/mm3      Lymphocytes, Absolute 2.31 10*3/mm3      Monocytes, Absolute 1.13 10*3/mm3      Eosinophils, Absolute 0.00 10*3/mm3      Basophils, Absolute 0.01 10*3/mm3      Immature Grans, Absolute 0.08 (H) 10*3/mm3     Procalcitonin [[id number]]  (Abnormal) Collected:  09/17/18 1638    Specimen:  Blood Updated:  09/17/18 1819     Procalcitonin <0.02 (L) ng/mL     Narrative:       As a Marker for Sepsis (Non-Neonates):   1. <0.5 ng/mL represents a low risk of severe sepsis and/or septic shock.  1. >2 ng/mL represents a high risk of severe sepsis and/or septic shock.    As a Marker for Lower Respiratory Tract Infections that require antibiotic therapy:  PCT on Admission     Antibiotic Therapy             6-12 Hrs later  > 0.5                Strongly Recommended            >0.25 - <0.5         Recommended  0.1 - 0.25           Discouraged                   Remeasure/reassess PCT  <0.1                 Strongly Discouraged          Remeasure/reassess PCT      As 28 day mortality risk marker: \"Change in Procalcitonin Result\" (> 80 % or <=80 %) if Day 0 (or Day 1) and Day 4 values are available. Refer to " http://[email or web address]-pct-calculator.com/   Change in PCT <=80 %   A decrease of PCT levels below or equal to 80 % defines a positive change in PCT test result representing a higher risk for 28-day all-cause mortality of patients diagnosed with severe sepsis or septic shock.  Change in PCT > 80 %   A decrease of PCT levels of more than 80 % defines a negative change in PCT result representing a lower risk for 28-day all-cause mortality of patients diagnosed with severe sepsis or septic shock.                      I ordered the above labs and reviewed the results    RADIOLOGY  XR Chest 1 View      The lungs are moderately well-expanded and clear and the heart is top  normal in size. There is no acute disease.       I ordered the above noted radiological studies. Interpreted by radiologist. Reviewed by me in PACS.       PROCEDURES  Procedures      PROGRESS AND CONSULTS  ED Course as of Sep 17 1859   Mon Sep 17, 2018   1639 SOA, COPD hypoxic  [EE]      ED Course User Index  [EE] John East, PA   1743 Ordered procalcitonin for further evaluation.     1745 Discussed that the pt's symptoms are related to her COPD. Discussed the pt's CXR and labs. Discussed the plan to give the pt another breathing treatment.    1749 Ordered albuterol.     1818 Placed call to A for admission.     1832 Pt continues to have SOA after 2 breathing treatments. Pt was tachypnic on walking to the bathroom and hypoxic on returning to her room. Discussed the plan to admit the pt. Pt is now on 2L of O2.     MEDICAL DECISION MAKING  Results were reviewed/discussed with the patient and they were also made aware of online access. Pt also made aware that some labs, such as cultures, will not be resulted during ER visit and follow up with PMD is necessary.     MDM  Number of Diagnoses or Management Options  COPD exacerbation (CMS/[facility]):      Amount and/or Complexity of Data Reviewed  Clinical lab tests: ordered and reviewed (Troponin: negative, WBC:  22.78, hemoglobin: 16.8, RBC: 5.21, procalcitonin: <0.02)  Tests in the radiology section of CPT®: ordered and reviewed (CXR: negative)  Decide to obtain previous medical records or to obtain history from someone other than the patient: yes  Review and summarize past medical records: yes  Discuss the patient with other providers: yes (Dr. Boss ([facility]    Patient Progress  Patient progress: stable         DIAGNOSIS  Final diagnoses:   COPD exacerbation (CMS/[facility])       DISPOSITION  ADMISSION    Discussed treatment plan and reason for admission with pt/family and admitting physician.  Pt/family voiced understanding of the plan for admission for further testing/treatment as needed.       Latest Documented Vital Signs:  As of 6:59 PM  BP- 137/87 HR- 70 Temp- 98 °F (36.7 °C) (Tympanic) O2 sat- 90%    --  Documentation assistance provided by steve Castro for Dr. Koroma.  Information recorded by the scribe was done at my direction and has been verified and validated by me.     Gypsy Castro  09/17/18 3940       Amando Koroma MD  09/18/18 6510

## 2018-09-17 NOTE — PROGRESS NOTES
Clinical Pharmacy Services: Medication History    Kathleen William is a 63 y.o. female presenting to [facility] for   Chief Complaint   Patient presents with   • Shortness of Breath       She  has a past medical history of Back pain; Disease of thyroid gland; Edema; Hyperlipidemia; and Neck pain.    Allergies as of 09/17/2018 - Reviewed 09/17/2018   Allergen Reaction Noted   • Codeine Itching 08/05/2016       Medication information was obtained from: Patient  Pharmacy and Phone Number: [facility] [phone]    Prior to Admission Medications     Prescriptions Last Dose Informant Patient Reported? Taking?    ibuprofen (ADVIL,MOTRIN) 800 MG tablet  Self Yes Yes    Take 800 mg by mouth Daily As Needed for Mild Pain .    levothyroxine (SYNTHROID, LEVOTHROID) 150 MCG tablet  Self Yes Yes    Take 150 mcg by mouth Daily.    rosuvastatin (CRESTOR) 40 MG tablet  Self No Yes    Take 1 tablet by mouth Daily.    varenicline (CHANTIX STARTING MONTH PAK) 0.5 MG X 11 & 1 MG X 42 tablet  Self No No    Take 0.5 mg one daily on days 1-2 and 0.5 mg twice daily on days 4-7. Then 1 mg twice daily for a total of 12 weeks.            Medication notes: Removed per patient, therapy complete: Proair, Norco, Zofran, Flomax    Added: Ibuprofen    Patient has not started taking Chantix    This medication list is complete to the best of my knowledge as of 9/17/2018    Please call if questions.    Caitlyn Thapa, Medication History Technician  9/17/2018 7:31 PM

## 2018-09-18 PROBLEM — J96.01 ACUTE RESPIRATORY FAILURE WITH HYPOXIA (HCC): Status: ACTIVE | Noted: 2018-09-18

## 2018-09-18 PROBLEM — J20.6 ACUTE BRONCHITIS DUE TO RHINOVIRUS: Status: ACTIVE | Noted: 2018-09-18

## 2018-09-18 LAB
ALBUMIN SERPL-MCNC: 5 G/DL (ref 3.5–5.2)
ALBUMIN/GLOB SERPL: 2.4 G/DL
ALP SERPL-CCNC: 105 U/L (ref 39–117)
ALT SERPL W P-5'-P-CCNC: 84 U/L (ref 1–33)
ANION GAP SERPL CALCULATED.3IONS-SCNC: 12.8 MMOL/L
AST SERPL-CCNC: 52 U/L (ref 1–32)
B PERT DNA SPEC QL NAA+PROBE: NOT DETECTED
BASOPHILS # BLD AUTO: 0.01 10*3/MM3 (ref 0–0.2)
BASOPHILS NFR BLD AUTO: 0.1 % (ref 0–1.5)
BILIRUB SERPL-MCNC: 0.3 MG/DL (ref 0.1–1.2)
BUN BLD-MCNC: 17 MG/DL (ref 8–23)
BUN/CREAT SERPL: 30.4 (ref 7–25)
C PNEUM DNA NPH QL NAA+NON-PROBE: NOT DETECTED
CALCIUM SPEC-SCNC: 9.4 MG/DL (ref 8.6–10.5)
CHLORIDE SERPL-SCNC: 94 MMOL/L (ref 98–107)
CO2 SERPL-SCNC: 24.2 MMOL/L (ref 22–29)
CREAT BLD-MCNC: 0.56 MG/DL (ref 0.57–1)
DEPRECATED RDW RBC AUTO: 46.6 FL (ref 37–54)
EOSINOPHIL # BLD AUTO: 0 10*3/MM3 (ref 0–0.7)
EOSINOPHIL NFR BLD AUTO: 0 % (ref 0.3–6.2)
ERYTHROCYTE [DISTWIDTH] IN BLOOD BY AUTOMATED COUNT: 13.6 % (ref 11.7–13)
FLUAV H1 2009 PAND RNA NPH QL NAA+PROBE: NOT DETECTED
FLUAV H1 HA GENE NPH QL NAA+PROBE: NOT DETECTED
FLUAV H3 RNA NPH QL NAA+PROBE: NOT DETECTED
FLUAV SUBTYP SPEC NAA+PROBE: NOT DETECTED
FLUBV RNA ISLT QL NAA+PROBE: NOT DETECTED
GFR SERPL CREATININE-BSD FRML MDRD: 110 ML/MIN/1.73
GLOBULIN UR ELPH-MCNC: 2.1 GM/DL
GLUCOSE BLD-MCNC: 145 MG/DL (ref 65–99)
HADV DNA SPEC NAA+PROBE: NOT DETECTED
HCOV 229E RNA SPEC QL NAA+PROBE: NOT DETECTED
HCOV HKU1 RNA SPEC QL NAA+PROBE: NOT DETECTED
HCOV NL63 RNA SPEC QL NAA+PROBE: NOT DETECTED
HCOV OC43 RNA SPEC QL NAA+PROBE: NOT DETECTED
HCT VFR BLD AUTO: 51.1 % (ref 35.6–45.5)
HGB BLD-MCNC: 16.6 G/DL (ref 11.9–15.5)
HMPV RNA NPH QL NAA+NON-PROBE: NOT DETECTED
HPIV1 RNA SPEC QL NAA+PROBE: NOT DETECTED
HPIV2 RNA SPEC QL NAA+PROBE: NOT DETECTED
HPIV3 RNA NPH QL NAA+PROBE: NOT DETECTED
HPIV4 P GENE NPH QL NAA+PROBE: NOT DETECTED
IMM GRANULOCYTES # BLD: 0.08 10*3/MM3 (ref 0–0.03)
IMM GRANULOCYTES NFR BLD: 0.4 % (ref 0–0.5)
LYMPHOCYTES # BLD AUTO: 2.07 10*3/MM3 (ref 0.9–4.8)
LYMPHOCYTES NFR BLD AUTO: 10.7 % (ref 19.6–45.3)
M PNEUMO IGG SER IA-ACNC: NOT DETECTED
MCH RBC QN AUTO: 30.6 PG (ref 26.9–32)
MCHC RBC AUTO-ENTMCNC: 32.5 G/DL (ref 32.4–36.3)
MCV RBC AUTO: 94.1 FL (ref 80.5–98.2)
MONOCYTES # BLD AUTO: 0.65 10*3/MM3 (ref 0.2–1.2)
MONOCYTES NFR BLD AUTO: 3.4 % (ref 5–12)
NEUTROPHILS # BLD AUTO: 16.55 10*3/MM3 (ref 1.9–8.1)
NEUTROPHILS NFR BLD AUTO: 85.4 % (ref 42.7–76)
PLATELET # BLD AUTO: 325 10*3/MM3 (ref 140–500)
PMV BLD AUTO: 10.9 FL (ref 6–12)
POTASSIUM BLD-SCNC: 4 MMOL/L (ref 3.5–5.2)
PROT SERPL-MCNC: 7.1 G/DL (ref 6–8.5)
RBC # BLD AUTO: 5.43 10*6/MM3 (ref 3.9–5.2)
RHINOVIRUS RNA SPEC NAA+PROBE: DETECTED
RSV RNA NPH QL NAA+NON-PROBE: NOT DETECTED
SODIUM BLD-SCNC: 131 MMOL/L (ref 136–145)
WBC NRBC COR # BLD: 19.36 10*3/MM3 (ref 4.5–10.7)

## 2018-09-18 PROCEDURE — 25010000002 METHYLPREDNISOLONE PER 125 MG: Performed by: INTERNAL MEDICINE

## 2018-09-18 PROCEDURE — 80053 COMPREHEN METABOLIC PANEL: CPT | Performed by: INTERNAL MEDICINE

## 2018-09-18 PROCEDURE — 87633 RESP VIRUS 12-25 TARGETS: CPT | Performed by: INTERNAL MEDICINE

## 2018-09-18 PROCEDURE — 87486 CHLMYD PNEUM DNA AMP PROBE: CPT | Performed by: INTERNAL MEDICINE

## 2018-09-18 PROCEDURE — 94799 UNLISTED PULMONARY SVC/PX: CPT

## 2018-09-18 PROCEDURE — 87581 M.PNEUMON DNA AMP PROBE: CPT | Performed by: INTERNAL MEDICINE

## 2018-09-18 PROCEDURE — 87798 DETECT AGENT NOS DNA AMP: CPT | Performed by: INTERNAL MEDICINE

## 2018-09-18 PROCEDURE — 85025 COMPLETE CBC W/AUTO DIFF WBC: CPT | Performed by: INTERNAL MEDICINE

## 2018-09-18 PROCEDURE — 25010000002 ENOXAPARIN PER 10 MG: Performed by: HOSPITALIST

## 2018-09-18 RX ORDER — FAMOTIDINE 20 MG/1
20 TABLET, FILM COATED ORAL DAILY
Status: DISCONTINUED | OUTPATIENT
Start: 2018-09-18 | End: 2018-09-20 | Stop reason: HOSPADM

## 2018-09-18 RX ORDER — IPRATROPIUM BROMIDE AND ALBUTEROL SULFATE 2.5; .5 MG/3ML; MG/3ML
3 SOLUTION RESPIRATORY (INHALATION) EVERY 4 HOURS PRN
Status: DISCONTINUED | OUTPATIENT
Start: 2018-09-18 | End: 2018-09-20 | Stop reason: HOSPADM

## 2018-09-18 RX ADMIN — HYDROCODONE BITARTRATE AND HOMATROPINE METHYLBROMIDE 5 ML: 5; 1.5 SOLUTION ORAL at 19:57

## 2018-09-18 RX ADMIN — METHYLPREDNISOLONE SODIUM SUCCINATE 80 MG: 125 INJECTION, POWDER, FOR SOLUTION INTRAMUSCULAR; INTRAVENOUS at 16:25

## 2018-09-18 RX ADMIN — ACETAMINOPHEN 650 MG: 325 TABLET ORAL at 06:15

## 2018-09-18 RX ADMIN — IPRATROPIUM BROMIDE AND ALBUTEROL SULFATE 3 ML: .5; 3 SOLUTION RESPIRATORY (INHALATION) at 10:33

## 2018-09-18 RX ADMIN — HYDROCODONE BITARTRATE AND HOMATROPINE METHYLBROMIDE 5 ML: 5; 1.5 SOLUTION ORAL at 15:24

## 2018-09-18 RX ADMIN — ACETAMINOPHEN 650 MG: 325 TABLET ORAL at 13:26

## 2018-09-18 RX ADMIN — ENOXAPARIN SODIUM 40 MG: 40 INJECTION SUBCUTANEOUS at 12:00

## 2018-09-18 RX ADMIN — IPRATROPIUM BROMIDE AND ALBUTEROL SULFATE 3 ML: .5; 3 SOLUTION RESPIRATORY (INHALATION) at 23:24

## 2018-09-18 RX ADMIN — ROSUVASTATIN CALCIUM 40 MG: 40 TABLET, FILM COATED ORAL at 08:19

## 2018-09-18 RX ADMIN — HYDROCODONE BITARTRATE AND HOMATROPINE METHYLBROMIDE 5 ML: 5; 1.5 SOLUTION ORAL at 23:57

## 2018-09-18 RX ADMIN — FAMOTIDINE 20 MG: 20 TABLET, FILM COATED ORAL at 18:08

## 2018-09-18 RX ADMIN — IPRATROPIUM BROMIDE AND ALBUTEROL SULFATE 3 ML: .5; 3 SOLUTION RESPIRATORY (INHALATION) at 19:31

## 2018-09-18 RX ADMIN — METHYLPREDNISOLONE SODIUM SUCCINATE 80 MG: 125 INJECTION, POWDER, FOR SOLUTION INTRAMUSCULAR; INTRAVENOUS at 23:15

## 2018-09-18 RX ADMIN — IPRATROPIUM BROMIDE AND ALBUTEROL SULFATE 3 ML: .5; 3 SOLUTION RESPIRATORY (INHALATION) at 14:17

## 2018-09-18 RX ADMIN — METHYLPREDNISOLONE SODIUM SUCCINATE 80 MG: 125 INJECTION, POWDER, FOR SOLUTION INTRAMUSCULAR; INTRAVENOUS at 08:19

## 2018-09-18 RX ADMIN — IPRATROPIUM BROMIDE AND ALBUTEROL SULFATE 3 ML: .5; 3 SOLUTION RESPIRATORY (INHALATION) at 06:57

## 2018-09-18 RX ADMIN — LEVOTHYROXINE SODIUM 150 MCG: 150 TABLET ORAL at 06:09

## 2018-09-18 NOTE — PLAN OF CARE
Problem: Patient Care Overview  Goal: Plan of Care Review  Outcome: Ongoing (interventions implemented as appropriate)   09/18/18 0502   Coping/Psychosocial   Plan of Care Reviewed With patient   Plan of Care Review   Progress improving   OTHER   Outcome Summary Pt admitted to the floor for possible NEW COPD. Pt smoker. Quit 1 week ago. SOA. SP02 mid 80s on RA. 2LO2 now. Ambulates to bathroom. Only complaint this shift is lack of sleep and HA. Tylenol for HA. VSS. afebrile. IV steroids Q8H. Sending RVP this am.      Goal: Individualization and Mutuality  Outcome: Ongoing (interventions implemented as appropriate)    Goal: Discharge Needs Assessment  Outcome: Ongoing (interventions implemented as appropriate)    Goal: Interprofessional Rounds/Family Conf  Outcome: Ongoing (interventions implemented as appropriate)      Problem: Chronic Obstructive Pulmonary Disease (Adult)  Goal: Signs and Symptoms of Listed Potential Problems Will be Absent, Minimized or Managed (Chronic Obstructive Pulmonary Disease)  Outcome: Ongoing (interventions implemented as appropriate)

## 2018-09-18 NOTE — H&P
Internal medicine history and physical   INTERNAL MEDICINE   [facility]       Patient Identification:  Name: Kathleen William  Age: 63 y.o.  Sex: female  :  1955  MRN: [id number]                   Primary Care Physician: Amairani Moura MD                                   Chief Complaint:  Progressive shortness of breath and cough not getting better despite antibiotics and steroids prescribed by primary care physician and urgent care.    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  She was cleaning up the basement and doing some repair work as well as doing some yardwork in that area involved pulling weeds and etc.  She started having some difficulty breathing cough and congestion which she initially thought that she may have been exposed to mold in the basement or allergies from the staff while doing the yard work.  2 days into her symptoms she went to urgent care and has subsequently saw her primary care physician and was started on antibiotics and steroids.  5 days ago she quit smoking because she was having significant shortness of breath and she was wheezing.  She continued to have these symptoms and minimal activity was per waking her to be out of breath.  She denies any fever or denies any chills denies any nausea or vomiting.  With that she she decided to come to emergency room for further evaluation.      Past Medical History:  Past Medical History:   Diagnosis Date   • Back pain    • Disease of thyroid gland    • Edema     HAND   • Hyperlipidemia    • Neck pain      Past Surgical History:  Past Surgical History:   Procedure Laterality Date   • BREAST SURGERY     • CHOLECYSTECTOMY     • HERNIA REPAIR     • HYSTERECTOMY     • TOE SURGERY        Home  Meds:  Prescriptions Prior to Admission   Medication Sig Dispense Refill Last Dose   • ibuprofen (ADVIL,MOTRIN) 800 MG tablet Take 800 mg by mouth Daily As Needed for Mild Pain .   Past Week at Unknown time   • levothyroxine (SYNTHROID, LEVOTHROID) 150 MCG tablet Take 150 mcg by mouth Daily.   9/16/2018 at Unknown time   • rosuvastatin (CRESTOR) 40 MG tablet Take 1 tablet by mouth Daily. 90 tablet 3 9/16/2018 at Unknown time   • varenicline (CHANTIX STARTING MONTH PAK) 0.5 MG X 11 & 1 MG X 42 tablet Take 0.5 mg one daily on days 1-2 and 0.5 mg twice daily on days 4-7. Then 1 mg twice daily for a total of 12 weeks. 42 tablet 1 Unknown at Unknown time     Current Meds:     Current Facility-Administered Medications:   •  sodium chloride 0.9 % flush 10 mL, 10 mL, Intravenous, PRN, Amando Koroma MD  •  Insert peripheral IV, , , Once **AND** sodium chloride 0.9 % flush 10 mL, 10 mL, Intravenous, PRN, John East PA  Allergies:  Allergies   Allergen Reactions   • Codeine Itching     Social History:   Social History   Substance Use Topics   • Smoking status: Current Every Day Smoker     Packs/day: 1.50   • Smokeless tobacco: Not on file   • Alcohol use No      Family History:  History reviewed. No pertinent family history.       Review of Systems  See history of present illness and past medical history.   Constitutional: Negative for fever.   HENT: Negative for sore throat.    Eyes: Negative.    Respiratory: Positive for cough and shortness of breath.    Cardiovascular: Negative for chest pain.   Gastrointestinal: Negative for abdominal pain, diarrhea and vomiting.   Genitourinary: Negative for dysuria.   Musculoskeletal: Negative for neck pain.   Skin: Negative for rash.   Allergic/Immunologic: Negative.    Neurological: Negative for weakness, numbness and headaches.   Hematological: Negative.    Psychiatric/Behavioral: Negative.      Vitals:   /64 (BP Location: Right arm, Patient Position: Sitting)   Pulse 71  "  Temp 97.8 °F (36.6 °C) (Oral)   Resp 18   Ht 160 cm (63\")   Wt 70.3 kg (155 lb)   SpO2 92%   BMI 27.46 kg/m²   I/O: No intake or output data in the 24 hours ending 09/17/18 2142  Exam:  General Appearance:    Alert, cooperative, no distress, appears stated age   Head:    Normocephalic, without obvious abnormality, atraumatic   Eyes:    PERRL, conjunctiva/corneas clear, EOM's intact, both eyes   Ears:    Normal external ear canals, both ears   Nose:   Nares normal, septum midline, mucosa normal, no drainage    or sinus tenderness   Throat:   Lips, tongue, gums normal; oral mucosa pink and moist   Neck:   Supple, symmetrical, trachea midline, no adenopathy;     thyroid:  no enlargement/tenderness/nodules; no carotid    bruit or JVD   Back:     Symmetric, no curvature, ROM normal, no CVA tenderness   Lungs:     Limited air movement occasional rhonchi no obvious use of accessory muscles of breathing noted    Chest Wall:    No tenderness or deformity    Heart:    Regular rate and rhythm, S1 and S2 normal, no murmur, rub   or gallop   Abdomen:     Soft, non-tender, bowel sounds active all four quadrants,     no masses, no hepatomegaly, no splenomegaly   Extremities:   Extremities normal, atraumatic, no cyanosis or edema   Pulses:   Pulses palpable in all extremities; symmetric all extremities   Skin:   Skin color normal, Skin is warm and dry,  no rashes or palpable lesions   Neurologic:   CNII-XII intact, motor strength grossly intact, sensation grossly intact to light touch, no focal deficits noted       Data Review:      I reviewed the patient's new clinical results.    Results from last 7 days  Lab Units 09/17/18  1638   WBC 10*3/mm3 22.78*   HEMOGLOBIN g/dL 16.8*   PLATELETS 10*3/mm3 334       Results from last 7 days  Lab Units 09/17/18  1638   SODIUM mmol/L 137   POTASSIUM mmol/L 4.3   CHLORIDE mmol/L 99   CO2 mmol/L 24.9   BUN mg/dL 16   CREATININE mg/dL 0.61   CALCIUM mg/dL 9.5   GLUCOSE mg/dL 138*      "   ONE VIEW PORTABLE CHEST     HISTORY: Shortness of breath.     The lungs are moderately well-expanded and clear and the heart is top  normal in size. There is no acute disease.        Assessment:  Active Hospital Problems    Diagnosis Date Noted   • **COPD exacerbation (CMS/[facility]) [J44.1] 09/17/2018   • Abnormal LFTs (liver function tests) [R79.89] 08/12/2016   • Hypothyroidism [E03.9] 08/10/2016   • Hyperglycemia [R73.9] 08/10/2016   • Hyperlipidemia [E78.5] 08/10/2016       Plan:  Probable acute bronchitis with underlying COPD with exacerbation and history of smoking - continue with IV steroids and nebulizer treatment and oxygen supplementation.  Check respiratory viral panel and consider pulmonary evaluation if her condition does not improve.  Low threshold to check ABG if she shows evidence of CO2 retention manifesting as increasing somnolence.  Smoking cessation counseling.  Mildly abnormal LFTs due to ?steatohepatitis - observe  Hyperlipidemia- continue her home medications  Hypothyroidism - continue thyroid replacement therapy  Hyperglycemia - likely due to ongoing use of steroids.  Check hemoglobin A1c.    Darline Boss MD   9/17/2018  9:42 PM  Much of this encounter note is an electronic transcription/translation of spoken language to printed text. The electronic translation of spoken language may permit erroneous, or at times, nonsensical words or phrases to be inadvertently transcribed; Although I have reviewed the note for such errors, some may still exist

## 2018-09-18 NOTE — PROGRESS NOTES
Name: Kathleen William ADMIT: 2018   : 1955  PCP: Amairani Moura MD    MRN: [id number] LOS: 1 days   AGE/SEX: 63 y.o. female  ROOM: [facility]     Subjective   Does not feel any better. States has not slept in 5 days. Has coughing fits.    Objective   Vital Signs  Temp:  [97.6 °F (36.4 °C)-98 °F (36.7 °C)] 97.6 °F (36.4 °C)  Heart Rate:  [65-91] 83  Resp:  [15-20] 16  BP: (130-149)/(63-89) 132/69  SpO2:  [89 %-98 %] 94 %  on  Flow (L/min):  [2] 2;   Device (Oxygen Therapy): nasal cannula  Body mass index is 27.46 kg/m².    Physical Exam   Constitutional: She is oriented to person, place, and time. She appears well-developed. She is cooperative.   Neck: No JVD present. No tracheal deviation present.   Cardiovascular: Normal rate and regular rhythm.    No murmur heard.  Pulmonary/Chest: Accessory muscle usage present. Tachypnea noted. She has decreased breath sounds (Throughout). She has wheezes.   Oxygen saturation 85% on room air during my exam.   Abdominal: Soft. Normal appearance and bowel sounds are normal. She exhibits no distension. There is no tenderness.   Musculoskeletal: She exhibits no edema.   Neurological: She is alert and oriented to person, place, and time.   Skin: Skin is warm and dry.   Psychiatric: She has a normal mood and affect. Her behavior is normal.   Nursing note and vitals reviewed.      Results Review:       I reviewed the patient's new clinical results.    Results from last 7 days  Lab Units 18  0324 18  1638   WBC 10*3/mm3 19.36* 22.78*   HEMOGLOBIN g/dL 16.6* 16.8*   PLATELETS 10*3/mm3 325 334     Results from last 7 days  Lab Units 18  0324 18  1638   SODIUM mmol/L 131* 137   POTASSIUM mmol/L 4.0 4.3   CHLORIDE mmol/L 94* 99   CO2 mmol/L 24.2 24.9   BUN mg/dL 17 16   CREATININE mg/dL 0.56* 0.61   GLUCOSE mg/dL 145* 138*   Estimated Creatinine Clearance: 98 mL/min (A) (by C-G formula based on SCr of 0.56 mg/dL (L)).  Results from last 7 days  Lab  Units 09/18/18  0324 09/17/18  1638   CALCIUM mg/dL 9.4 9.5   ALBUMIN g/dL 5.00 5.10                 heparin (porcine) 5,000 Units Subcutaneous Q12H   ipratropium-albuterol 3 mL Nebulization Q4H - RT   levothyroxine 150 mcg Oral QAM   methylPREDNISolone sodium succinate 80 mg Intravenous Q8H   rosuvastatin 40 mg Oral Daily      Diet Regular      Assessment/Plan      Active Hospital Problems    Diagnosis Date Noted   • **COPD exacerbation (CMS/[facility]) [J44.1] 09/17/2018   • Acute respiratory failure with hypoxia (CMS/[facility]) [J96.01] 09/18/2018   • Acute bronchitis due to Rhinovirus [J20.6] 09/18/2018   • Abnormal LFTs (liver function tests) [R79.89] 08/12/2016   • Hypothyroidism [E03.9] 08/10/2016   • Hyperglycemia [R73.9] 08/10/2016   • Hyperlipidemia [E78.5] 08/10/2016      Resolved Hospital Problems    Diagnosis Date Noted Date Resolved   No resolved problems to display.       Ms. William is a 63 y.o. smoker who presents to [facility] with dyspnea, cough, wheezing, increased sputum and failure of outpatient treatment. She is admitted for a COPD exacerbation likely due to viral bronchitis and cigarette smoking.     · Admitted to a telemetry unit for continual monitoring of oxygen saturation, blood pressure, heart rate, respiratory status etc.  · Patient has features of respiratory insufficiency including use of accessory muscles, tachypnea, hypoxia and failure to improve with initial emergency treatment.  · Continue SOLUMEDROL 80 mg IV every 8 hours.  · DUONEBS every 4 hours scheduled and every 4 hours as needed.  · Will add Hycodan cough syrup.  · Titrate NC Oxygen to keep sats 88-92%  · CXR without lower infection.   · Consult pulmonary if no improvement by tomorrow.       Jean Carlos Swanson MD  [facility]  09/18/18  8:23 AM

## 2018-09-18 NOTE — PROGRESS NOTES
Discharge Planning Assessment  [facility]     Patient Name: Kathleen William  MRN: [id number]  Today's Date: 9/18/2018    Admit Date: 9/17/2018          Discharge Needs Assessment     Row Name 09/18/18 1437       Living Environment    Lives With spouse    Current Living Arrangements home/apartment/condo    Primary Care Provided by self    Family Caregiver if Needed spouse       Resource/Environmental Concerns    Resource/Environmental Concerns none    Transportation Concerns car, none       Transition Planning    Patient/Family Anticipates Transition to home       Discharge Needs Assessment    Readmission Within the Last 30 Days no previous admission in last 30 days    Concerns to be Addressed no discharge needs identified;denies needs/concerns at this time    Equipment Currently Used at Home nebulizer            Discharge Plan     Row Name 09/18/18 1438       Plan    Plan Home    Patient/Family in Agreement with Plan yes    Plan Comments Facesheet information was verified.  Patient lives in a home with her spouse, Denys William [phone].  She is IADLs and has no rehab history.  She has a nebulizer.  Following her oxygen needs.  Her [place] will transport at [place].  Denies needs.  Uses [facility] and is compliant with her medications........................Patti Gonzalez RN        Destination     No service coordination in this encounter.      Durable Medical Equipment     No service coordination in this encounter.      Dialysis/Infusion     No service coordination in this encounter.      Home Medical Care     No service coordination in this encounter.      Social Care     No service coordination in this encounter.                Demographic Summary     Row Name 09/18/18 1436       General Information    Admission Type inpatient    Arrived From home    Referral Source admission list    Preferred Language English       Contact Information    Permission Granted to Share Info With case  manager;family/designee    Contact Information Comments Spouse, Denys [phone]            Functional Status     Row Name 09/18/18 0977       Functional Status    Usual Activity Tolerance good    Current Activity Tolerance good       Functional Status, IADL    Medications independent    Meal Preparation independent    Housekeeping independent    Laundry independent    Shopping independent       Mental Status    General Appearance WDL WDL       Mental Status Summary    Recent Changes in Mental Status/Cognitive Functioning no changes       Employment/    Employment Status employed full time            Psychosocial    No documentation.           Abuse/Neglect    No documentation.           Legal    No documentation.           Substance Abuse    No documentation.           Patient Forms    No documentation.         Patti Gonzalez RN

## 2018-09-18 NOTE — PLAN OF CARE
Problem: Patient Care Overview  Goal: Plan of Care Review  Outcome: Ongoing (interventions implemented as appropriate)   09/18/18 2439   Coping/Psychosocial   Plan of Care Reviewed With patient   Plan of Care Review   Progress no change   OTHER   Outcome Summary Patient continues to have coughing that is causing pain in chest and back. This a.m. was requiring 1L of O2 to keep sats greater than 90%. Able to wean off O2 by afternoon. Initiated hycodan and PRN nebs      Goal: Individualization and Mutuality  Outcome: Ongoing (interventions implemented as appropriate)    Goal: Discharge Needs Assessment  Outcome: Ongoing (interventions implemented as appropriate)    Goal: Interprofessional Rounds/Family Conf  Outcome: Ongoing (interventions implemented as appropriate)      Problem: Chronic Obstructive Pulmonary Disease (Adult)  Goal: Signs and Symptoms of Listed Potential Problems Will be Absent, Minimized or Managed (Chronic Obstructive Pulmonary Disease)  Outcome: Ongoing (interventions implemented as appropriate)

## 2018-09-19 ENCOUNTER — APPOINTMENT (OUTPATIENT)
Dept: GENERAL RADIOLOGY | Facility: HOSPITAL | Age: 63
End: 2018-09-19

## 2018-09-19 LAB
ARTERIAL PATENCY WRIST A: POSITIVE
ATMOSPHERIC PRESS: 746.2 MMHG
BASE EXCESS BLDA CALC-SCNC: -1.2 MMOL/L (ref 0–2)
BDY SITE: ABNORMAL
D DIMER PPP FEU-MCNC: 0.31 MCGFEU/ML (ref 0–0.49)
HCO3 BLDA-SCNC: 22 MMOL/L (ref 22–28)
MODALITY: ABNORMAL
PCO2 BLDA: 32.5 MM HG (ref 35–45)
PH BLDA: 7.44 PH UNITS (ref 7.35–7.45)
PO2 BLDA: 53.9 MM HG (ref 80–100)
SAO2 % BLDCOA: 89.1 % (ref 92–99)
TOTAL RATE: 20 BREATHS/MINUTE

## 2018-09-19 PROCEDURE — 25010000002 ENOXAPARIN PER 10 MG: Performed by: HOSPITALIST

## 2018-09-19 PROCEDURE — 36600 WITHDRAWAL OF ARTERIAL BLOOD: CPT

## 2018-09-19 PROCEDURE — 85379 FIBRIN DEGRADATION QUANT: CPT | Performed by: INTERNAL MEDICINE

## 2018-09-19 PROCEDURE — 71046 X-RAY EXAM CHEST 2 VIEWS: CPT

## 2018-09-19 PROCEDURE — 63710000001 PREDNISONE PER 5 MG: Performed by: HOSPITALIST

## 2018-09-19 PROCEDURE — 25010000002 METHYLPREDNISOLONE PER 125 MG: Performed by: INTERNAL MEDICINE

## 2018-09-19 PROCEDURE — 94799 UNLISTED PULMONARY SVC/PX: CPT

## 2018-09-19 PROCEDURE — 82803 BLOOD GASES ANY COMBINATION: CPT

## 2018-09-19 RX ORDER — GUAIFENESIN 600 MG/1
1200 TABLET, EXTENDED RELEASE ORAL EVERY 12 HOURS SCHEDULED
Status: DISCONTINUED | OUTPATIENT
Start: 2018-09-19 | End: 2018-09-20 | Stop reason: HOSPADM

## 2018-09-19 RX ORDER — ZOLPIDEM TARTRATE 5 MG/1
TABLET ORAL
Qty: 2 TABLET | Refills: 0 | Status: SHIPPED | OUTPATIENT
Start: 2018-09-19

## 2018-09-19 RX ORDER — PREDNISONE 50 MG/1
50 TABLET ORAL
Status: DISCONTINUED | OUTPATIENT
Start: 2018-09-19 | End: 2018-09-20 | Stop reason: HOSPADM

## 2018-09-19 RX ADMIN — IPRATROPIUM BROMIDE AND ALBUTEROL SULFATE 3 ML: .5; 3 SOLUTION RESPIRATORY (INHALATION) at 03:02

## 2018-09-19 RX ADMIN — IPRATROPIUM BROMIDE AND ALBUTEROL SULFATE 3 ML: .5; 3 SOLUTION RESPIRATORY (INHALATION) at 20:05

## 2018-09-19 RX ADMIN — METHYLPREDNISOLONE SODIUM SUCCINATE 80 MG: 125 INJECTION, POWDER, FOR SOLUTION INTRAMUSCULAR; INTRAVENOUS at 08:15

## 2018-09-19 RX ADMIN — IPRATROPIUM BROMIDE AND ALBUTEROL SULFATE 3 ML: .5; 3 SOLUTION RESPIRATORY (INHALATION) at 14:32

## 2018-09-19 RX ADMIN — GUAIFENESIN 1200 MG: 600 TABLET, EXTENDED RELEASE ORAL at 21:34

## 2018-09-19 RX ADMIN — PREDNISONE 50 MG: 50 TABLET ORAL at 14:03

## 2018-09-19 RX ADMIN — ACETAMINOPHEN 650 MG: 325 TABLET ORAL at 08:16

## 2018-09-19 RX ADMIN — ROSUVASTATIN CALCIUM 40 MG: 40 TABLET, FILM COATED ORAL at 08:15

## 2018-09-19 RX ADMIN — HYDROCODONE BITARTRATE AND HOMATROPINE METHYLBROMIDE 5 ML: 5; 1.5 SOLUTION ORAL at 14:04

## 2018-09-19 RX ADMIN — HYDROCODONE BITARTRATE AND HOMATROPINE METHYLBROMIDE 10 ML: 5; 1.5 SOLUTION ORAL at 21:34

## 2018-09-19 RX ADMIN — ENOXAPARIN SODIUM 40 MG: 40 INJECTION SUBCUTANEOUS at 08:16

## 2018-09-19 RX ADMIN — IPRATROPIUM BROMIDE AND ALBUTEROL SULFATE 3 ML: .5; 3 SOLUTION RESPIRATORY (INHALATION) at 22:58

## 2018-09-19 RX ADMIN — FAMOTIDINE 20 MG: 20 TABLET, FILM COATED ORAL at 08:15

## 2018-09-19 RX ADMIN — GUAIFENESIN 1200 MG: 600 TABLET, EXTENDED RELEASE ORAL at 14:03

## 2018-09-19 RX ADMIN — LEVOTHYROXINE SODIUM 150 MCG: 150 TABLET ORAL at 07:26

## 2018-09-19 RX ADMIN — IPRATROPIUM BROMIDE AND ALBUTEROL SULFATE 3 ML: .5; 3 SOLUTION RESPIRATORY (INHALATION) at 08:25

## 2018-09-19 RX ADMIN — IPRATROPIUM BROMIDE AND ALBUTEROL SULFATE 3 ML: .5; 3 SOLUTION RESPIRATORY (INHALATION) at 11:48

## 2018-09-19 RX ADMIN — HYDROCODONE BITARTRATE AND HOMATROPINE METHYLBROMIDE 5 ML: 5; 1.5 SOLUTION ORAL at 04:45

## 2018-09-19 NOTE — PLAN OF CARE
Problem: Patient Care Overview  Goal: Plan of Care Review  Outcome: Ongoing (interventions implemented as appropriate)   09/19/18 6913   Coping/Psychosocial   Plan of Care Reviewed With patient   Plan of Care Review   Progress improving   OTHER   Outcome Summary No significant changes throughout the night. Pt restless and awake most of the night. VSS. afebrile. WBC slightly elevated today. IVF. ABX completed. Awaiting recertification.      Goal: Individualization and Mutuality  Outcome: Ongoing (interventions implemented as appropriate)    Goal: Discharge Needs Assessment  Outcome: Ongoing (interventions implemented as appropriate)    Goal: Interprofessional Rounds/Family Conf  Outcome: Ongoing (interventions implemented as appropriate)      Problem: Chronic Obstructive Pulmonary Disease (Adult)  Goal: Signs and Symptoms of Listed Potential Problems Will be Absent, Minimized or Managed (Chronic Obstructive Pulmonary Disease)  Outcome: Ongoing (interventions implemented as appropriate)

## 2018-09-19 NOTE — PROGRESS NOTES
Name: Kathleen William ADMIT: 2018   : 1955  PCP: Amairani Moura MD    MRN: [id number] LOS: 2 days   AGE/SEX: 63 y.o. female  ROOM: [facility]     Subjective   Complaining of coughing fits. Did not sleep. Shortness of breath overall about the same.    Objective   Vital Signs  Temp:  [97.5 °F (36.4 °C)-97.7 °F (36.5 °C)] 97.5 °F (36.4 °C)  Heart Rate:  [66-94] 72  Resp:  [15-20] 20  BP: (100-136)/(56-70) 100/59  SpO2:  [88 %-99 %] 91 %  on  Flow (L/min):  [1-2] 1;   Device (Oxygen Therapy): room air  Body mass index is 27.46 kg/m².    Physical Exam   Constitutional: She is oriented to person, place, and time. She appears well-developed. She is cooperative.   Neck: No JVD present. No tracheal deviation present.   Cardiovascular: Normal rate and regular rhythm.    No murmur heard.  Pulmonary/Chest: Tachypnea noted. She has decreased breath sounds in the right upper field and the left upper field.   Abdominal: Soft. Normal appearance and bowel sounds are normal. She exhibits no distension. There is no tenderness.   Musculoskeletal: She exhibits no edema.   Neurological: She is alert and oriented to person, place, and time.   Skin: Skin is warm and dry.   Psychiatric: She has a normal mood and affect. Her behavior is normal.   Nursing note and vitals reviewed.      Results Review:       I reviewed the patient's new clinical results.    Results from last 7 days  Lab Units 18  0324 18  1638   WBC 10*3/mm3 19.36* 22.78*   HEMOGLOBIN g/dL 16.6* 16.8*   PLATELETS 10*3/mm3 325 334       Results from last 7 days  Lab Units 18  0324 18  1638   SODIUM mmol/L 131* 137   POTASSIUM mmol/L 4.0 4.3   CHLORIDE mmol/L 94* 99   CO2 mmol/L 24.2 24.9   BUN mg/dL 17 16   CREATININE mg/dL 0.56* 0.61   GLUCOSE mg/dL 145* 138*   Estimated Creatinine Clearance: 98 mL/min (A) (by C-G formula based on SCr of 0.56 mg/dL (L)).    Results from last 7 days  Lab Units 18  0324 18  1638   CALCIUM  mg/dL 9.4 9.5   ALBUMIN g/dL 5.00 5.10         Results from last 7 days  Lab Units 09/18/18  0610   ADENOVIRUS DETECTION BY PCR  Not Detected   CORONAVIRUS 229E  Not Detected   CORONAVIRUS HKU1  Not Detected   CORONAVIRUS NL63  Not Detected   CORONAVIRUS OC43  Not Detected   HUMAN METAPNEUMOVIRUS  Not Detected   HUMAN RHINOVIRUS/ENTEROVIRUS  Detected*   INFLUENZA B PCR  Not Detected   PARAINFLUENZA 1  Not Detected   PARAINFLUENZA VIRUS 2  Not Detected   PARAINFLUENZA VIRUS 3  Not Detected   PARAINFLUENZA VIRUS 4  Not Detected   BORDETELLA PERTUSSIS PCR  Not Detected   CHLAMYDOPHILA PNEUMONIAE PCR  Not Detected   MYCOPLAMA PNEUMO PCR  Not Detected   INFLUENZA A PCR  Not Detected   INFLUENZA A H3  Not Detected   INFLUENZA A H1  Not Detected   RSV, PCR  Not Detected             enoxaparin 40 mg Subcutaneous Q24H   famotidine 20 mg Oral Daily   ipratropium-albuterol 3 mL Nebulization Q4H - RT   levothyroxine 150 mcg Oral QAM   methylPREDNISolone sodium succinate 80 mg Intravenous Q8H   rosuvastatin 40 mg Oral Daily      Diet Regular      Assessment/Plan      Active Hospital Problems    Diagnosis Date Noted   • **COPD exacerbation (CMS/[facility]) [J44.1] 09/17/2018   • Acute respiratory failure with hypoxia (CMS/[facility]) [J96.01] 09/18/2018   • Acute bronchitis due to Rhinovirus [J20.6] 09/18/2018   • Abnormal LFTs (liver function tests) [R79.89] 08/12/2016   • Hypothyroidism [E03.9] 08/10/2016   • Hyperglycemia [R73.9] 08/10/2016   • Hyperlipidemia [E78.5] 08/10/2016      Resolved Hospital Problems    Diagnosis Date Noted Date Resolved   No resolved problems to display.       Ms. William is a 63 y.o. smoker who presents to [facility] with dyspnea, cough, wheezing, increased sputum and failure of outpatient treatment. She is admitted for a COPD exacerbation likely due to viral bronchitis and cigarette smoking.     · Admitted to a telemetry unit for continual monitoring of oxygen saturation, blood pressure, heart  rate, respiratory status etc.  · Seems to be breathing a little more comfortably today but still hypoxic. Breath sounds have improved.  · Will change SOLUMEDROL to PREDNISONE.  · Continue DUONEBS every 4 hours scheduled and every 4 hours as needed.  · Will add used to next. Hycodan at night to aid sleeping.  · Titrate NC Oxygen to keep sats 88-92%  · CXR without lower infection. Symptoms present for almost 2 weeks per patient. Antibiotics indicated?  · Consult pulmonary. Hypoxia seems somewhat out of proportion to exam. Will eventually need formal PFTs and possible sleep study.       Jean Carlos Swanson MD  [facility]  09/19/18  7:26 AM

## 2018-09-19 NOTE — PLAN OF CARE
Problem: Patient Care Overview  Goal: Plan of Care Review  Outcome: Ongoing (interventions implemented as appropriate)   09/19/18 0545 09/19/18 0548   Coping/Psychosocial   Plan of Care Reviewed With --  patient   Plan of Care Review   Progress --  improving   OTHER   Outcome Summary No significant changes throughout the night. Pt SpO2 maintained low 90s while off oxygen for the night. Hycodan Q4H for cough. She still drops to the mid to high 80s with activity. VSS. Afebrile.  --      Goal: Individualization and Mutuality  Outcome: Ongoing (interventions implemented as appropriate)    Goal: Discharge Needs Assessment  Outcome: Ongoing (interventions implemented as appropriate)    Goal: Interprofessional Rounds/Family Conf  Outcome: Ongoing (interventions implemented as appropriate)      Problem: Chronic Obstructive Pulmonary Disease (Adult)  Goal: Signs and Symptoms of Listed Potential Problems Will be Absent, Minimized or Managed (Chronic Obstructive Pulmonary Disease)  Outcome: Ongoing (interventions implemented as appropriate)

## 2018-09-19 NOTE — PAYOR COMM NOTE
"Dhruv Luis (63 y.o. Female)                    ATTENTION;  JORGE A SANDERSON RN, CLINICALS FOR YOUR REVIEW, AUTH PENDING [id number]                 REPLY TO UR DEPT, BY  [phone] OR CALL        Date of Birth Social Security Number Address Home Phone MRN    1955  [id number] [id number] [phone] [id number]    Yazdanism Marital Status          None Unknown       Admission Date Admission Type Admitting Provider Attending Provider Department, Room/Bed    9/17/18 Emergency Darline Boss MD Ray, Jonathan, MD [address], [address]/1    Discharge Date Discharge Disposition Discharge Destination                       Attending Provider:  Jean Carlos Swanson MD    Allergies:  Codeine    Isolation:  Droplet   Infection:  Rhinovirus  (09/18/18)   Code Status:  CPR    Ht:  160 cm (63\")   Wt:  70.3 kg (155 lb)    Admission Cmt:  None   Principal Problem:  COPD exacerbation (CMS/HCC) [J44.1]                 Active Insurance as of 9/17/2018     Primary Coverage     Payor Plan Insurance Group Employer/Plan Group    [employer]HE[employer] BLUE CROSS [employer] BLUE CROSS BLUE SHIELD PPO [id number]     Payor Plan Address Payor Plan Phone Number Effective From Effective To    [address] [phone] 10/1/2011     [facility] [id number]       Subscriber Name Subscriber Birth Date Member ID       DHRUV LUIS 1955 [id number]           Secondary Coverage     Payor Plan Insurance Group Employer/Plan Group    [id number]     Payor Plan Address Payor Plan Phone Number Effective From Effective To    [address] [phone] 1/1/2016     [facility] [id number]       Subscriber Name Subscriber Birth Date Member ID       LAUREN LUIS 10/18/1948 [id number]                 Emergency Contacts      (Rel.) Home Phone Work Phone Mobile Phone    SloanjoshLauren thompson (Spouse) [phone] -- --               History & Physical      Darline Boss MD " at 2018  9:42 PM          Internal medicine history and physical   INTERNAL MEDICINE   [facility]       Patient Identification:  Name: Kathleen William  Age: 63 y.o.  Sex: female  :  1955  MRN: [id number]                   Primary Care Physician: Amairani Moura MD                                   Chief Complaint:  Progressive shortness of breath and cough not getting better despite antibiotics and steroids prescribed by primary care physician and urgent care.    History of Present Illness:   Patient is a 63-year-old female with past medical history as noted below has been chronic smoker until 5 days ago was in her usual state of her health until 9 or 10 days ago when she was doing some repair work and eventual [facility] and [place].  She was cleaning up the basement and doing some repair work as well as doing some yardwork in that area involved pulling weeds and etc.  She started having some difficulty breathing cough and congestion which she initially thought that she may have been exposed to mold in the basement or allergies from the staff while doing the yard work.  2 days into her symptoms she went to urgent care and has subsequently saw her primary care physician and was started on antibiotics and steroids.  5 days ago she quit smoking because she was having significant shortness of breath and she was wheezing.  She continued to have these symptoms and minimal activity was per waking her to be out of breath.  She denies any fever or denies any chills denies any nausea or vomiting.  With that she she decided to come to emergency room for further evaluation.      Past Medical History:  Past Medical History:   Diagnosis Date   • Back pain    • Disease of thyroid gland    • Edema     HAND   • Hyperlipidemia    • Neck pain      Past Surgical History:  Past Surgical History:   Procedure Laterality Date   • BREAST SURGERY     • CHOLECYSTECTOMY     • HERNIA REPAIR     • HYSTERECTOMY      • TOE SURGERY        Home Meds:  Prescriptions Prior to Admission   Medication Sig Dispense Refill Last Dose   • ibuprofen (ADVIL,MOTRIN) 800 MG tablet Take 800 mg by mouth Daily As Needed for Mild Pain .   Past Week at Unknown time   • levothyroxine (SYNTHROID, LEVOTHROID) 150 MCG tablet Take 150 mcg by mouth Daily.   9/16/2018 at Unknown time   • rosuvastatin (CRESTOR) 40 MG tablet Take 1 tablet by mouth Daily. 90 tablet 3 9/16/2018 at Unknown time   • varenicline (CHANTIX STARTING MONTH PAK) 0.5 MG X 11 & 1 MG X 42 tablet Take 0.5 mg one daily on days 1-2 and 0.5 mg twice daily on days 4-7. Then 1 mg twice daily for a total of 12 weeks. 42 tablet 1 Unknown at Unknown time     Current Meds:     Current Facility-Administered Medications:   •  sodium chloride 0.9 % flush 10 mL, 10 mL, Intravenous, PRN, Amando Koroma MD  •  Insert peripheral IV, , , Once **AND** sodium chloride 0.9 % flush 10 mL, 10 mL, Intravenous, PRN, John East PA  Allergies:  Allergies   Allergen Reactions   • Codeine Itching     Social History:   Social History   Substance Use Topics   • Smoking status: Current Every Day Smoker     Packs/day: 1.50   • Smokeless tobacco: Not on file   • Alcohol use No      Family History:  History reviewed. No pertinent family history.       Review of Systems  See history of present illness and past medical history.   Constitutional: Negative for fever.   HENT: Negative for sore throat.    Eyes: Negative.    Respiratory: Positive for cough and shortness of breath.    Cardiovascular: Negative for chest pain.   Gastrointestinal: Negative for abdominal pain, diarrhea and vomiting.   Genitourinary: Negative for dysuria.   Musculoskeletal: Negative for neck pain.   Skin: Negative for rash.   Allergic/Immunologic: Negative.    Neurological: Negative for weakness, numbness and headaches.   Hematological: Negative.    Psychiatric/Behavioral: Negative.      Vitals:   /64 (BP Location: Right arm, Patient  "Position: Sitting)   Pulse 71   Temp 97.8 °F (36.6 °C) (Oral)   Resp 18   Ht 160 cm (63\")   Wt 70.3 kg (155 lb)   SpO2 92%   BMI 27.46 kg/m²    I/O: No intake or output data in the 24 hours ending 09/17/18 2142  Exam:  General Appearance:    Alert, cooperative, no distress, appears stated age   Head:    Normocephalic, without obvious abnormality, atraumatic   Eyes:    PERRL, conjunctiva/corneas clear, EOM's intact, both eyes   Ears:    Normal external ear canals, both ears   Nose:   Nares normal, septum midline, mucosa normal, no drainage    or sinus tenderness   Throat:   Lips, tongue, gums normal; oral mucosa pink and moist   Neck:   Supple, symmetrical, trachea midline, no adenopathy;     thyroid:  no enlargement/tenderness/nodules; no carotid    bruit or JVD   Back:     Symmetric, no curvature, ROM normal, no CVA tenderness   Lungs:     Limited air movement occasional rhonchi no obvious use of accessory muscles of breathing noted    Chest Wall:    No tenderness or deformity    Heart:    Regular rate and rhythm, S1 and S2 normal, no murmur, rub   or gallop   Abdomen:     Soft, non-tender, bowel sounds active all four quadrants,     no masses, no hepatomegaly, no splenomegaly   Extremities:   Extremities normal, atraumatic, no cyanosis or edema   Pulses:   Pulses palpable in all extremities; symmetric all extremities   Skin:   Skin color normal, Skin is warm and dry,  no rashes or palpable lesions   Neurologic:   CNII-XII intact, motor strength grossly intact, sensation grossly intact to light touch, no focal deficits noted       Data Review:      I reviewed the patient's new clinical results.    Results from last 7 days  Lab Units 09/17/18  1638   WBC 10*3/mm3 22.78*   HEMOGLOBIN g/dL 16.8*   PLATELETS 10*3/mm3 334       Results from last 7 days  Lab Units 09/17/18  1638   SODIUM mmol/L 137   POTASSIUM mmol/L 4.3   CHLORIDE mmol/L 99   CO2 mmol/L 24.9   BUN mg/dL 16   CREATININE mg/dL 0.61   CALCIUM mg/dL " 9.5   GLUCOSE mg/dL 138*        ONE VIEW PORTABLE CHEST     HISTORY: Shortness of breath.     The lungs are moderately well-expanded and clear and the heart is top  normal in size. There is no acute disease.        Assessment:  Active Hospital Problems    Diagnosis Date Noted   • **COPD exacerbation (CMS/HCC) [J44.1] 09/17/2018   • Abnormal LFTs (liver function tests) [R79.89] 08/12/2016   • Hypothyroidism [E03.9] 08/10/2016   • Hyperglycemia [R73.9] 08/10/2016   • Hyperlipidemia [E78.5] 08/10/2016       Plan:  Probable acute bronchitis with underlying COPD with exacerbation and history of smoking - continue with IV steroids and nebulizer treatment and oxygen supplementation.  Check respiratory viral panel and consider pulmonary evaluation if her condition does not improve.  Low threshold to check ABG if she shows evidence of CO2 retention manifesting as increasing somnolence.  Smoking cessation counseling.  Mildly abnormal LFTs due to ?steatohepatitis - observe  Hyperlipidemia- continue her home medications  Hypothyroidism - continue thyroid replacement therapy  Hyperglycemia - likely due to ongoing use of steroids.  Check hemoglobin A1c.    Darline Boss MD   9/17/2018  9:42 PM  Much of this encounter note is an electronic transcription/translation of spoken language to printed text. The electronic translation of spoken language may permit erroneous, or at times, nonsensical words or phrases to be inadvertently transcribed; Although I have reviewed the note for such errors, some may still exist      Electronically signed by Darline Boss MD at 9/18/2018  4:20 AM          Emergency Department Notes      Puja Martell, RN at 9/17/2018  4:09 PM        Pt reports she has been soa for a week. Pt has seen her dr twice in a week and was given steroids and antibiotics that pt states is not helping.    Electronically signed by Puja Martell, RN at 9/17/2018  4:10 PM     Ina Love, RN at 9/17/2018  4:50 PM         Pt to ED with c/o cough, congestion x 1 week, pt saw PMD, placed on medrol dosepak and abx, not improving, began coughing up white phlegm today.      Ina Love RN  09/17/18 5556      Electronically signed by Ina Love RN at 9/17/2018  4:50 PM     Amando Koroma MD at 9/17/2018  5:43 PM           EMERGENCY DEPARTMENT ENCOUNTER    CHIEF COMPLAINT  Chief Complaint: SOA  History given by: Pt  History limited by: Nothing  Room Number: 18/18  PMD: Amairani Moura MD      HPI:  Pt is a 63 y.o. female with a h/o COPD who presents complaining of SOA that began 1.5 weeks ago. The pt states that she was working in a wet basement and then worked outside, and since then has felt SOA and has been coughing. The pt has had course of steroids and has had breathing treatment (albuterol with nebulizer) without significant improvement.     The pt states that she quit smoking when her symptoms began.     Duration:  1.5 weeks  Onset: Gradual  Timing: Constant  Location: Chest  Quality: Difficult to breathe  Intensity/Severity: Mdoerate  Progression: No change  Associated Symptoms: Cough   Aggravating Factors: Unknown  Alleviating Factors: Unknown  Treatment before arrival: The pt is on a steroid course and has used breathing treatments without relief.     PAST MEDICAL HISTORY  Active Ambulatory Problems     Diagnosis Date Noted   • Disease of respiratory system 08/10/2016   • Hyperglycemia 08/10/2016   • Hyperlipidemia 08/10/2016   • Hypothyroidism 08/10/2016   • Abnormal LFTs (liver function tests) 08/12/2016   • Erythrocytosis 12/16/2016     Resolved Ambulatory Problems     Diagnosis Date Noted   • No Resolved Ambulatory Problems     Past Medical History:   Diagnosis Date   • Back pain    • Disease of thyroid gland    • Edema    • Hyperlipidemia    • Neck pain        PAST SURGICAL HISTORY  Past Surgical History:   Procedure Laterality Date   • BREAST SURGERY     • CHOLECYSTECTOMY     • HERNIA REPAIR     •  HYSTERECTOMY     • TOE SURGERY         FAMILY HISTORY  History reviewed. No pertinent family history.    SOCIAL HISTORY  Social History     Social History   • Marital status: Unknown     Spouse name: N/A   • Number of children: N/A   • Years of education: N/A     Occupational History   • Not on file.     Social History Main Topics   • Smoking status: Current Every Day Smoker     Packs/day: 1.50   • Smokeless tobacco: Not on file   • Alcohol use No   • Drug use: Unknown   • Sexual activity: Defer         ALLERGIES  Codeine    REVIEW OF SYSTEMS  Review of Systems   Constitutional: Negative for fever.   HENT: Negative for sore throat.    Eyes: Negative.    Respiratory: Positive for cough and shortness of breath.    Cardiovascular: Negative for chest pain.   Gastrointestinal: Negative for abdominal pain, diarrhea and vomiting.   Genitourinary: Negative for dysuria.   Musculoskeletal: Negative for neck pain.   Skin: Negative for rash.   Allergic/Immunologic: Negative.    Neurological: Negative for weakness, numbness and headaches.   Hematological: Negative.    Psychiatric/Behavioral: Negative.    All other systems reviewed and are negative.      PHYSICAL EXAM  ED Triage Vitals   Temp Heart Rate Resp BP SpO2   09/17/18 1610 09/17/18 1610 09/17/18 1634 09/17/18 1634 09/17/18 1610   98 °F (36.7 °C) 91 18 149/89 91 %      Temp src Heart Rate Source Patient Position BP Location FiO2 (%)   09/17/18 1610 09/17/18 1634 09/17/18 1634 09/17/18 1634 --   Tympanic Monitor Sitting Right arm        Physical Exam   Constitutional: She is oriented to person, place, and time. No distress.   HENT:   Head: Normocephalic and atraumatic.   Eyes: Pupils are equal, round, and reactive to light. EOM are normal.   Neck: Normal range of motion. Neck supple.   Cardiovascular: Normal rate, regular rhythm and normal heart sounds.    Pulmonary/Chest: She is in respiratory distress (Mild). She has wheezes in the right upper field, the right middle  field, the right lower field, the left upper field, the left middle field and the left lower field.   Abdominal: Soft. There is no tenderness. There is no rebound and no guarding.   Musculoskeletal: Normal range of motion. She exhibits no edema.   Neurological: She is alert and oriented to person, place, and time. She has normal sensation and normal strength.   Skin: Skin is warm and dry. No rash noted.   Psychiatric: Mood and affect normal.   Nursing note and vitals reviewed.      LAB RESULTS  Lab Results (last 24 hours)     Procedure Component Value Units Date/Time    BNP [[id number]]  (Normal) Collected:  09/17/18 1638    Specimen:  Blood Updated:  09/17/18 1721     proBNP 58.0 pg/mL     Narrative:       Among patients with dyspnea, NT-proBNP is highly sensitive for the detection of acute congestive heart failure. In addition NT-proBNP of <300 pg/ml effectively rules out acute congestive heart failure with 99% negative predictive value.    CBC & Differential [[id number]] Collected:  09/17/18 1638    Specimen:  Blood Updated:  09/17/18 1702    Narrative:       The following orders were created for panel order CBC & Differential.  Procedure                               Abnormality         Status                     ---------                               -----------         ------                     CBC Auto Differential[[id number]]        Abnormal            Final result                 Please view results for these tests on the individual orders.    Comprehensive Metabolic Panel [[id number]]  (Abnormal) Collected:  09/17/18 1638    Specimen:  Blood Updated:  09/17/18 1723     Glucose 138 (H) mg/dL      BUN 16 mg/dL      Creatinine 0.61 mg/dL      Sodium 137 mmol/L      Potassium 4.3 mmol/L      Chloride 99 mmol/L      CO2 24.9 mmol/L      Calcium 9.5 mg/dL      Total Protein 7.4 g/dL      Albumin 5.10 g/dL      ALT (SGPT) 74 (H) U/L      AST (SGOT) 33 (H) U/L      Alkaline Phosphatase 104 U/L      Total Bilirubin  0.3 mg/dL      eGFR Non African Amer 99 mL/min/1.73      Globulin 2.3 gm/dL      A/G Ratio 2.2 g/dL      BUN/Creatinine Ratio 26.2 (H)     Anion Gap 13.1 mmol/L     Troponin [[id number]]  (Normal) Collected:  09/17/18 1638    Specimen:  Blood Updated:  09/17/18 1723     Troponin T <0.010 ng/mL     Narrative:       Troponin T Reference Ranges:  Less than 0.03 ng/mL:    Negative for AMI  0.03 to 0.09 ng/mL:      Indeterminant for AMI  Greater than 0.09 ng/mL: Positive for AMI    CBC Auto Differential [[id number]]  (Abnormal) Collected:  09/17/18 1638    Specimen:  Blood Updated:  09/17/18 1702     WBC 22.78 (H) 10*3/mm3      RBC 5.21 (H) 10*6/mm3      Hemoglobin 16.8 (H) g/dL      Hematocrit 48.3 (H) %      MCV 92.7 fL      MCH 32.2 (H) pg      MCHC 34.8 g/dL      RDW 13.8 (H) %      RDW-SD 46.8 fl      MPV 10.9 fL      Platelets 334 10*3/mm3      Neutrophil % 84.9 (H) %      Lymphocyte % 10.1 (L) %      Monocyte % 5.0 %      Eosinophil % 0.0 (L) %      Basophil % 0.0 %      Immature Grans % 0.4 %      Neutrophils, Absolute 19.33 (H) 10*3/mm3      Lymphocytes, Absolute 2.31 10*3/mm3      Monocytes, Absolute 1.13 10*3/mm3      Eosinophils, Absolute 0.00 10*3/mm3      Basophils, Absolute 0.01 10*3/mm3      Immature Grans, Absolute 0.08 (H) 10*3/mm3     Procalcitonin [[id number]]  (Abnormal) Collected:  09/17/18 1638    Specimen:  Blood Updated:  09/17/18 1819     Procalcitonin <0.02 (L) ng/mL     Narrative:       As a Marker for Sepsis (Non-Neonates):   1. <0.5 ng/mL represents a low risk of severe sepsis and/or septic shock.  1. >2 ng/mL represents a high risk of severe sepsis and/or septic shock.    As a Marker for Lower Respiratory Tract Infections that require antibiotic therapy:  PCT on Admission     Antibiotic Therapy             6-12 Hrs later  > 0.5                Strongly Recommended            >0.25 - <0.5         Recommended  0.1 - 0.25           Discouraged                   Remeasure/reassess PCT  <0.1          "        Strongly Discouraged          Remeasure/reassess PCT      As 28 day mortality risk marker: \"Change in Procalcitonin Result\" (> 80 % or <=80 %) if Day 0 (or Day 1) and Day 4 values are available. Refer to http://www.[facility]-pct-calculator.com/   Change in PCT <=80 %   A decrease of PCT levels below or equal to 80 % defines a positive change in PCT test result representing a higher risk for 28-day all-cause mortality of patients diagnosed with severe sepsis or septic shock.  Change in PCT > 80 %   A decrease of PCT levels of more than 80 % defines a negative change in PCT result representing a lower risk for 28-day all-cause mortality of patients diagnosed with severe sepsis or septic shock.                      I ordered the above labs and reviewed the results    RADIOLOGY  XR Chest 1 View      The lungs are moderately well-expanded and clear and the heart is top  normal in size. There is no acute disease.       I ordered the above noted radiological studies. Interpreted by radiologist. Reviewed by me in PACS.       PROCEDURES  Procedures      PROGRESS AND CONSULTS  ED Course as of Sep 17 1859   Mon Sep 17, 2018   1639 SOA, COPD hypoxic  [EE]      ED Course User Index  [EE] John [place], PA   1743 Ordered procalcitonin for further evaluation.     1745 Discussed that the pt's symptoms are related to her COPD. Discussed the pt's CXR and labs. Discussed the plan to give the pt another breathing treatment.    1749 Ordered albuterol.     1818 Placed call to A for admission.     1832 Pt continues to have SOA after 2 breathing treatments. Pt was tachypnic on walking to the bathroom and hypoxic on returning to her room. Discussed the plan to admit the pt. Pt is now on 2L of O2.     MEDICAL DECISION MAKING  Results were reviewed/discussed with the patient and they were also made aware of online access. Pt also made aware that some labs, such as cultures, will not be resulted during ER visit and follow up with PMD is " necessary.     MDM  Number of Diagnoses or Management Options  COPD exacerbation (CMS/HCC):      Amount and/or Complexity of Data Reviewed  Clinical lab tests: ordered and reviewed (Troponin: negative, WBC: 22.78, hemoglobin: 16.8, RBC: 5.21, procalcitonin: <0.02)  Tests in the radiology section of CPT®:  ordered and reviewed (CXR: negative)  Decide to obtain previous medical records or to obtain history from someone other than the patient: yes  Review and summarize past medical records: yes  Discuss the patient with other providers: yes (Dr. Boss ([facility]    Patient Progress  Patient progress: stable         DIAGNOSIS  Final diagnoses:   COPD exacerbation (CMS/[facility])       DISPOSITION  ADMISSION    Discussed treatment plan and reason for admission with pt/family and admitting physician.  Pt/family voiced understanding of the plan for admission for further testing/treatment as needed.       Latest Documented Vital Signs:  As of 6:59 PM  BP- 137/87 HR- 70 Temp- 98 °F (36.7 °C) (Tympanic) O2 sat- 90%    --  Documentation assistance provided by steve Castro for Dr. Koroma.  Information recorded by the scribe was done at my direction and has been verified and validated by me.     Gypsy Castro  09/17/18 1902       Amando Koroma MD  09/18/18 1253      Electronically signed by Amando Koroma MD at 9/18/2018 12:53 PM    Lines, Drains & Airways    Active LDAs     Name:   Placement date:   Placement time:   Site:   Days:    Peripheral IV 09/17/18 1638 Left Antecubital  09/17/18    1638    Antecubital    1         Inactive LDAs     None                Hospital Medications (all)       Dose Frequency Start End    acetaminophen (TYLENOL) tablet 650 mg 650 mg Every 4 Hours PRN 9/17/2018     Sig - Route: Take 2 tablets by mouth Every 4 (Four) Hours As Needed for Mild Pain . - Oral    albuterol (PROVENTIL) nebulizer solution 0.083% 2.5 mg/3mL 2.5 mg Once 9/17/2018 9/17/2018    Sig - Route: Take 2.5 mg by nebulization  1 (One) Time. - Nebulization    enoxaparin (LOVENOX) syringe 40 mg 40 mg Every 24 Hours 9/18/2018     Sig - Route: Inject 0.4 mL under the skin into the appropriate area as directed Daily. - Subcutaneous    famotidine (PEPCID) tablet 20 mg 20 mg Daily 9/18/2018     Sig - Route: Take 1 tablet by mouth Daily. - Oral    HYDROcodone-homatropine (HYCODAN) 5-1.5 MG/5ML syrup 5 mL 5 mL Every 4 Hours PRN 9/18/2018 9/28/2018    Sig - Route: Take 5 mL by mouth Every 4 (Four) Hours As Needed for Cough. - Oral    ipratropium-albuterol (DUO-NEB) nebulizer solution 3 mL 3 mL Once 9/17/2018 9/17/2018    Sig - Route: Take 3 mL by nebulization 1 (One) Time. - Nebulization    Cosign for Ordering: Accepted by Lazarus Crisostomo MD on 9/17/2018  7:18 PM    ipratropium-albuterol (DUO-NEB) nebulizer solution 3 mL 3 mL Every 4 Hours - RT 9/17/2018     Sig - Route: Take 3 mL by nebulization Every 4 (Four) Hours. - Nebulization    ipratropium-albuterol (DUO-NEB) nebulizer solution 3 mL 3 mL Every 4 Hours PRN 9/18/2018     Sig - Route: Take 3 mL by nebulization Every 4 (Four) Hours As Needed for Shortness of Air. - Nebulization    levothyroxine (SYNTHROID, LEVOTHROID) tablet 150 mcg 150 mcg Every Morning 9/18/2018     Sig - Route: Take 1 tablet by mouth Every Morning. - Oral    methylPREDNISolone sodium succinate (SOLU-Medrol) injection 80 mg 80 mg Every 8 Hours 9/17/2018     Sig - Route: Infuse 1.28 mL into a venous catheter Every 8 (Eight) Hours. - Intravenous    ondansetron (ZOFRAN) injection 4 mg 4 mg Every 6 Hours PRN 9/17/2018     Sig - Route: Infuse 2 mL into a venous catheter Every 6 (Six) Hours As Needed for Nausea or Vomiting. - Intravenous    rosuvastatin (CRESTOR) tablet 40 mg 40 mg Daily 9/18/2018     Sig - Route: Take 1 tablet by mouth Daily. - Oral    sodium chloride 0.9 % flush 1-10 mL 1-10 mL As Needed 9/17/2018     Sig - Route: Infuse 1-10 mL into a venous catheter As Needed for Line Care. - Intravenous    heparin (porcine)  "5000 UNIT/ML injection 5,000 Units (Discontinued) 5,000 Units Every 12 Hours Scheduled 9/17/2018 9/18/2018    Sig - Route: Inject 1 mL under the skin into the appropriate area as directed Every 12 (Twelve) Hours. - Subcutaneous    sodium chloride 0.9 % flush 10 mL (Discontinued) 10 mL As Needed 9/17/2018 9/18/2018    Sig - Route: Infuse 10 mL into a venous catheter As Needed for Line Care. - Intravenous    Cosign for Ordering: Accepted by Amando Koroma MD on 9/17/2018  5:40 PM    sodium chloride 0.9 % flush 10 mL (Discontinued) 10 mL As Needed 9/17/2018 9/18/2018    Sig - Route: Infuse 10 mL into a venous catheter As Needed for Line Care. - Intravenous    Cosign for Ordering: Accepted by Lazarus Crisostomo MD on 9/17/2018  7:18 PM    Linked Group 1:  \"And\" Linked Group Details              Orders (last 72 hrs)     Start     Ordered    09/18/18 1600  famotidine (PEPCID) tablet 20 mg  Daily      09/18/18 1459    09/18/18 1456  ipratropium-albuterol (DUO-NEB) nebulizer solution 3 mL  Every 4 Hours PRN      09/18/18 1459    09/18/18 1455  HYDROcodone-homatropine (HYCODAN) 5-1.5 MG/5ML syrup 5 mL  Every 4 Hours PRN      09/18/18 1455    09/18/18 0915  enoxaparin (LOVENOX) syringe 40 mg  Every 24 Hours      09/18/18 0828    09/18/18 0900  rosuvastatin (CRESTOR) tablet 40 mg  Daily      09/17/18 2145 09/18/18 0700  levothyroxine (SYNTHROID, LEVOTHROID) tablet 150 mcg  Every Morning      09/17/18 2145    09/18/18 0600  Comprehensive Metabolic Panel  Morning Draw      09/17/18 2145 09/18/18 0600  CBC Auto Differential  Morning Draw      09/17/18 2145 09/18/18 0412  Respiratory Panel, PCR - Swab, Nasopharynx  Once      09/18/18 0411    09/18/18 0000  Vital Signs  Every 4 Hours      09/17/18 2145 09/17/18 2330  ipratropium-albuterol (DUO-NEB) nebulizer solution 3 mL  Every 4 Hours - RT      09/17/18 2145    09/17/18 2245  heparin (porcine) 5000 UNIT/ML injection 5,000 Units  Every 12 Hours Scheduled,   Status:  " Discontinued      09/17/18 2145 09/17/18 2245  methylPREDNISolone sodium succinate (SOLU-Medrol) injection 80 mg  Every 8 Hours      09/17/18 2145 09/17/18 2145  Diet Regular  Diet Effective Now      09/17/18 2145 09/17/18 2144  acetaminophen (TYLENOL) tablet 650 mg  Every 4 Hours PRN      09/17/18 2145 09/17/18 2144  ondansetron (ZOFRAN) injection 4 mg  Every 6 Hours PRN      09/17/18 2145 09/17/18 2144  Code Status and Medical Interventions:  Continuous      09/17/18 2145 09/17/18 2144  Intake & Output  Every Shift      09/17/18 2145 09/17/18 2144  Weigh Patient  Once,   Status:  Canceled      09/17/18 2145 09/17/18 2144  Oxygen Therapy- Nasal Cannula; Titrate for SPO2: 90%  Continuous      09/17/18 2145 09/17/18 2144  Insert Peripheral IV  Once      09/17/18 2145 09/17/18 2144  Saline Lock & Maintain IV Access  Continuous      09/17/18 2145 09/17/18 2143  sodium chloride 0.9 % flush 1-10 mL  As Needed      09/17/18 2145 09/17/18 1904  Inpatient Admission  Once      09/17/18 1903    09/17/18 1819  [facility] (on-call MD unless specified)  Once     Provider:  Darline Boss MD    09/17/18 1818    09/17/18 1750  albuterol (PROVENTIL) nebulizer solution 0.083% 2.5 mg/3mL  Once      09/17/18 1749    09/17/18 1744  Procalcitonin  STAT      09/17/18 1743    09/17/18 1658  CBC Auto Differential  Once      09/17/18 1657    09/17/18 1641  ipratropium-albuterol (DUO-NEB) nebulizer solution 3 mL  Once      09/17/18 1639    09/17/18 1641  CBC Auto Differential  Once,   Status:  Canceled      09/17/18 1640    09/17/18 1641  Comprehensive Metabolic Panel  Once      09/17/18 1640    09/17/18 1641  Troponin  Once      09/17/18 1640    09/17/18 1640  BNP  Once      09/17/18 1640    09/17/18 1640  CBC & Differential  Once      09/17/18 1640    09/17/18 1639  Insert peripheral IV  Once      09/17/18 1638    09/17/18 1639  Ponca Draw  Once      09/17/18 1638    09/17/18 1639  Light Blue Top  PROCEDURE  ONCE      18 1638    18 1639  Green Top (Gel)  PROCEDURE ONCE      18 1638    18 1639  Lavender Top  PROCEDURE ONCE      18 1638    18 1639  Gold Top - SST  PROCEDURE ONCE      18 1638    18 1639  Oxygen Therapy- Nasal Cannula; 2 LPM; Titrate for SPO2: 94%  Continuous      18 1639    18 1639  Troponin  Once,   Status:  Canceled      18 1639    18 1639  BNP  Once,   Status:  Canceled      18 1639    18 1638  sodium chloride 0.9 % flush 10 mL  As Needed,   Status:  Discontinued      18 1638    18 1638  CBC & Differential  Once,   Status:  Canceled      18 1639    18 1638  Comprehensive Metabolic Panel  Once,   Status:  Canceled      18 1639    18 1638  XR Chest 1 View  1 Time Imaging      18 1639    18 1638  Insert peripheral IV  Once      18 1639    18 1638  CBC Auto Differential  PROCEDURE ONCE,   Status:  Canceled      18 1639    18 1637  sodium chloride 0.9 % flush 10 mL  As Needed,   Status:  Discontinued      18 1639    18 1634  ECG 12 Lead  Once      18 1639    --  levothyroxine (SYNTHROID, LEVOTHROID) 150 MCG tablet  Daily      18 193    --  ibuprofen (ADVIL,MOTRIN) 800 MG tablet  Daily PRN      18 1930             Physician Progress Notes       Jean Carlos Swanson MD at 2018  8:23 AM              Name: Kathleen William ADMIT: 2018   : 1955  PCP: Amairani Moura MD    MRN: [id number] LOS: 1 days   AGE/SEX: 63 y.o. female  ROOM: [facility]     Subjective   Does not feel any better. States has not slept in 5 days. Has coughing fits.    Objective   Vital Signs  Temp:  [97.6 °F (36.4 °C)-98 °F (36.7 °C)] 97.6 °F (36.4 °C)  Heart Rate:  [65-91] 83  Resp:  [15-20] 16  BP: (130-149)/(63-89) 132/69  SpO2:  [89 %-98 %] 94 %  on  Flow (L/min):  [2] 2;   Device (Oxygen Therapy): nasal cannula  Body mass index is 27.46  kg/m².    Physical Exam   Constitutional: She is oriented to person, place, and time. She appears well-developed. She is cooperative.   Neck: No JVD present. No tracheal deviation present.   Cardiovascular: Normal rate and regular rhythm.    No murmur heard.  Pulmonary/Chest: Accessory muscle usage present. Tachypnea noted. She has decreased breath sounds (Throughout). She has wheezes.   Oxygen saturation 85% on room air during my exam.   Abdominal: Soft. Normal appearance and bowel sounds are normal. She exhibits no distension. There is no tenderness.   Musculoskeletal: She exhibits no edema.   Neurological: She is alert and oriented to person, place, and time.   Skin: Skin is warm and dry.   Psychiatric: She has a normal mood and affect. Her behavior is normal.   Nursing note and vitals reviewed.      Results Review:       I reviewed the patient's new clinical results.    Results from last 7 days  Lab Units 09/18/18  0324 09/17/18  1638   WBC 10*3/mm3 19.36* 22.78*   HEMOGLOBIN g/dL 16.6* 16.8*   PLATELETS 10*3/mm3 325 334     Results from last 7 days  Lab Units 09/18/18  0324 09/17/18  1638   SODIUM mmol/L 131* 137   POTASSIUM mmol/L 4.0 4.3   CHLORIDE mmol/L 94* 99   CO2 mmol/L 24.2 24.9   BUN mg/dL 17 16   CREATININE mg/dL 0.56* 0.61   GLUCOSE mg/dL 145* 138*   Estimated Creatinine Clearance: 98 mL/min (A) (by C-G formula based on SCr of 0.56 mg/dL (L)).  Results from last 7 days  Lab Units 09/18/18  0324 09/17/18  1638   CALCIUM mg/dL 9.4 9.5   ALBUMIN g/dL 5.00 5.10                 heparin (porcine) 5,000 Units Subcutaneous Q12H   ipratropium-albuterol 3 mL Nebulization Q4H - RT   levothyroxine 150 mcg Oral QAM   methylPREDNISolone sodium succinate 80 mg Intravenous Q8H   rosuvastatin 40 mg Oral Daily      Diet Regular      Assessment/Plan     Active Hospital Problems    Diagnosis Date Noted   • **COPD exacerbation (CMS/HCC) [J44.1] 09/17/2018   • Acute respiratory failure with hypoxia (CMS/HCC) [J96.01]  09/18/2018   • Acute bronchitis due to Rhinovirus [J20.6] 09/18/2018   • Abnormal LFTs (liver function tests) [R79.89] 08/12/2016   • Hypothyroidism [E03.9] 08/10/2016   • Hyperglycemia [R73.9] 08/10/2016   • Hyperlipidemia [E78.5] 08/10/2016      Resolved Hospital Problems    Diagnosis Date Noted Date Resolved   No resolved problems to display.       Ms. William is a 63 y.o. smoker who presents to [facility] with dyspnea, cough, wheezing, increased sputum and failure of outpatient treatment. She is admitted for a COPD exacerbation likely due to viral bronchitis and cigarette smoking.     · Admitted to a telemetry unit for continual monitoring of oxygen saturation, blood pressure, heart rate, respiratory status etc.  · Patient has features of respiratory insufficiency including use of accessory muscles, tachypnea, hypoxia and failure to improve with initial emergency treatment.  · Continue SOLUMEDROL 80 mg IV every 8 hours.  · DUONEBS every 4 hours scheduled and every 4 hours as needed.  · Will add Hycodan cough syrup.  · Titrate NC Oxygen to keep sats 88-92%  · CXR without lower infection.   · Consult pulmonary if no improvement by tomorrow.       Jean Carlos Swanson MD  [facility]  09/18/18  8:23 AM      Electronically signed by Jean Carlos Swanson MD at 9/18/2018  2:55 PM

## 2018-09-19 NOTE — CONSULTS
[facility]  Phone: [phone]  Peewee Murphy MD      Subjective   LOS: 2 days     Thank you for this consultation.  63-year-old female who was been a lifelong smoker.  She smokes a pack of cigarettes a day.  Since a very young age.  She developed shortness of breath and cough for the last 2 weeks.  She was given multiple rounds of steroids by her primary care physician without improvement.  Patient states that she has a prescribed inhaler but does not use it as it doesn't seem to help her.  Unclear if she has shortness of breath at baseline but certainly recently she's had very limited exertional capacity.  On arrival here she has been found to have rhinovirus infection.  She states that she continues to feel short of breath.  She has hypothyroidism.  Otherwise denies any heart problems, history of cancer, strokes, diabetes.    She does report feeling sleepy during the day.  She has loud snoring at night.  She has never previously been tested for sleep apnea.    Kathleen BHAVNA William  reports that she does not drink alcohol.,  reports that she has been smoking.  She has been smoking about 1.50 packs per day. She does not have any smokeless tobacco history on file.     Past Hx:  has a past medical history of Back pain; Disease of thyroid gland; Edema; Hyperlipidemia; and Neck pain.  Surg Hx:  has a past surgical history that includes Toe Surgery; Hysterectomy; Cholecystectomy; Breast surgery; and Hernia repair.  FH: family history is not on file.  SH:  reports that she has been smoking.  She has been smoking about 1.50 packs per day. She does not have any smokeless tobacco history on file. Drug use questions deferred to the physician. She reports that she does not drink alcohol.    Prescriptions Prior to Admission   Medication Sig Dispense Refill Last Dose   • ibuprofen (ADVIL,MOTRIN) 800 MG tablet Take 800 mg by mouth Daily As Needed for Mild Pain .   Past Week at Unknown time   • levothyroxine  (SYNTHROID, LEVOTHROID) 150 MCG tablet Take 150 mcg by mouth Daily.   2018 at Unknown time   • rosuvastatin (CRESTOR) 40 MG tablet Take 1 tablet by mouth Daily. 90 tablet 3 2018 at Unknown time   • varenicline (CHANTIX STARTING MONTH ) 0.5 MG X 11 & 1 MG X 42 tablet Take 0.5 mg one daily on days 1-2 and 0.5 mg twice daily on days 4-7. Then 1 mg twice daily for a total of 12 weeks. 42 tablet 1 Unknown at Unknown time     Allergies   Allergen Reactions   • Codeine Itching       Review of Systems   Constitutional: Negative for chills and fever.   HENT: Negative for congestion, postnasal drip and trouble swallowing.    Respiratory: Positive for cough and shortness of breath. Negative for wheezing.    Cardiovascular: Negative for chest pain and leg swelling.   Gastrointestinal: Negative for abdominal pain, diarrhea, nausea and vomiting.   Endocrine: Negative for cold intolerance and heat intolerance.   Genitourinary: Negative for frequency and urgency.   Musculoskeletal: Negative for arthralgias and back pain.   Skin: Negative for pallor and rash.   Neurological: Negative for seizures and headaches.   Psychiatric/Behavioral: Negative for confusion. The patient is not nervous/anxious.      Vital Signs past 24hrs  BP range: BP: (100-136)/(59-70) 125/61  Pulse range: Heart Rate:  [66-96] 84  Resp rate range: Resp:  [16-20] 18  Temp range: Temp (24hrs), Av.6 °F (36.4 °C), Min:97.5 °F (36.4 °C), Max:97.6 °F (36.4 °C)    Oxygen range: SpO2:  [83 %-95 %] 93 %; Flow (L/min):  [1] 1;   Device (Oxygen Therapy): room air  70.3 kg (155 lb); Body mass index is 27.46 kg/m².  I/O this shift:  In: 760 [P.O.:750; I.V.:10]  Out: -     Adult female sitting up in bed.  No acute distress.  Currently on room air.  Pupils equal and reactive to light.  Oropharynx shows a class II Mallampati airway.  No posterior pharyngeal discharge.  Nasopharynx without discharge septum midline.  JVP not elevated trachea midline thyroid not  enlarged.  Lungs reveal bilateral air entry.  Diminished breath sounds equal with no wheezing.  Percussion note resonant chest expansion equal no chest wall deformities or tenderness.  Heart examination S1-S2 present rhythm regular no murmurs.  No edema lower extremities.  Abdomen is obese, soft, nontender.  Bowel sounds present no liver spleen enlargement.  No peripheral cyanosis clubbing.  Moves all 4 extremities.  Sensory motor intact.  No cervical, axillary, inguinal adenopathy.    Results Review:    I have reviewed the laboratory and imaging data from current admission. My annotations are as noted in assessment and plan.    Medication Review:  I have reviewed the current MAR. My annotations are as noted in assessment and plan.    Plan   PCCM Problems  Acute rhinovirus infection  Probable exacerbation of COPD  Current smoker  Suspected obstructive sleep apnea  Hypersomnolence, snoring  Obesity    Plan of Treatment  She has acute rhinovirus infection which is probably responsible for the cough.  This can take a couple of weeks to clear up.  Supportive care for the same.    It sounds like she may have had a COPD exacerbation a few days ago.  She was treated with steroids.  This may still be some exacerbation.  I do not disagree with oral steroids.  However presently she is not wheezing.  Unclear if something else is going on.  I will check an ABG, PA and lateral chest x-ray, and d-dimer.  If d-dimer is elevated it may be appropriate to get a CT chest PE protocol.    She needs a walking oximetry before discharge as well as nocturnal oximetry.  She certainly requires outpatient evaluation for obstructive sleep apnea.  [Ordered]    Part of this note may be an electronic transcription/translation of spoken language to printed text using the Dragon Dictation System.

## 2018-09-19 NOTE — PLAN OF CARE
Problem: Patient Care Overview  Goal: Plan of Care Review  Outcome: Ongoing (interventions implemented as appropriate)   09/19/18 4042   Coping/Psychosocial   Plan of Care Reviewed With patient   Plan of Care Review   Progress no change   OTHER   Outcome Summary Blood gas completed, and 2L applied due to low PO2. Pulmonary consult, lab work, and xray ordered.        Problem: Chronic Obstructive Pulmonary Disease (Adult)  Goal: Signs and Symptoms of Listed Potential Problems Will be Absent, Minimized or Managed (Chronic Obstructive Pulmonary Disease)  Outcome: Ongoing (interventions implemented as appropriate)

## 2018-09-19 NOTE — NURSING NOTE
Patient is sitting in the room and 87 oxygen percent on room air, so of oxygen had to be applied to bring her up to an acceptable 92-93 percent.  Will continue to monitor her and adjust as needed.

## 2018-09-19 NOTE — NURSING NOTE
Patient had her blood gas done and her pO2 was low at 53.9 while others values were okay.  2L of oxygen were applied again per Respiratory.

## 2018-09-20 VITALS
TEMPERATURE: 97.6 F | DIASTOLIC BLOOD PRESSURE: 67 MMHG | HEIGHT: 63 IN | OXYGEN SATURATION: 91 % | WEIGHT: 155 LBS | RESPIRATION RATE: 20 BRPM | HEART RATE: 72 BPM | BODY MASS INDEX: 27.46 KG/M2 | SYSTOLIC BLOOD PRESSURE: 129 MMHG

## 2018-09-20 PROBLEM — J96.01 ACUTE RESPIRATORY FAILURE WITH HYPOXIA (HCC): Status: RESOLVED | Noted: 2018-09-18 | Resolved: 2018-09-20

## 2018-09-20 PROBLEM — J44.1 COPD EXACERBATION (HCC): Status: RESOLVED | Noted: 2018-09-17 | Resolved: 2018-09-20

## 2018-09-20 PROBLEM — J20.6 ACUTE BRONCHITIS DUE TO RHINOVIRUS: Status: RESOLVED | Noted: 2018-09-18 | Resolved: 2018-09-20

## 2018-09-20 PROCEDURE — 94799 UNLISTED PULMONARY SVC/PX: CPT

## 2018-09-20 PROCEDURE — 25010000002 ENOXAPARIN PER 10 MG: Performed by: HOSPITALIST

## 2018-09-20 PROCEDURE — 25010000002 ONDANSETRON PER 1 MG: Performed by: INTERNAL MEDICINE

## 2018-09-20 PROCEDURE — 63710000001 PREDNISONE PER 5 MG: Performed by: HOSPITALIST

## 2018-09-20 RX ORDER — BUDESONIDE AND FORMOTEROL FUMARATE DIHYDRATE 160; 4.5 UG/1; UG/1
2 AEROSOL RESPIRATORY (INHALATION) 2 TIMES DAILY
Qty: 1 INHALER | Refills: 11 | Status: SHIPPED | OUTPATIENT
Start: 2018-09-20

## 2018-09-20 RX ORDER — PREDNISONE 50 MG/1
50 TABLET ORAL
Qty: 3 TABLET | Refills: 0 | Status: SHIPPED | OUTPATIENT
Start: 2018-09-21 | End: 2018-09-24

## 2018-09-20 RX ORDER — ALBUTEROL SULFATE 90 UG/1
2 AEROSOL, METERED RESPIRATORY (INHALATION) EVERY 4 HOURS PRN
Qty: 20.1 G | Refills: 3 | Status: SHIPPED | OUTPATIENT
Start: 2018-09-20 | End: 2018-10-20

## 2018-09-20 RX ADMIN — ACETAMINOPHEN 650 MG: 325 TABLET ORAL at 08:44

## 2018-09-20 RX ADMIN — ENOXAPARIN SODIUM 40 MG: 40 INJECTION SUBCUTANEOUS at 08:44

## 2018-09-20 RX ADMIN — ONDANSETRON 4 MG: 2 INJECTION INTRAMUSCULAR; INTRAVENOUS at 08:44

## 2018-09-20 RX ADMIN — ROSUVASTATIN CALCIUM 40 MG: 40 TABLET, FILM COATED ORAL at 08:44

## 2018-09-20 RX ADMIN — IPRATROPIUM BROMIDE AND ALBUTEROL SULFATE 3 ML: .5; 3 SOLUTION RESPIRATORY (INHALATION) at 08:13

## 2018-09-20 RX ADMIN — IPRATROPIUM BROMIDE AND ALBUTEROL SULFATE 3 ML: .5; 3 SOLUTION RESPIRATORY (INHALATION) at 12:10

## 2018-09-20 RX ADMIN — LEVOTHYROXINE SODIUM 150 MCG: 150 TABLET ORAL at 06:15

## 2018-09-20 RX ADMIN — PREDNISONE 50 MG: 50 TABLET ORAL at 08:44

## 2018-09-20 RX ADMIN — FAMOTIDINE 20 MG: 20 TABLET, FILM COATED ORAL at 08:44

## 2018-09-20 RX ADMIN — GUAIFENESIN 1200 MG: 600 TABLET, EXTENDED RELEASE ORAL at 08:44

## 2018-09-20 NOTE — PROGRESS NOTES
Case Management Discharge Note    Final Note: Home with family transport and O2 per Jennifer    Destination     No service has been selected for the patient.      Durable Medical Equipment - Selection Complete     Service Request Status Selected Specialties Address Phone Number Fax Number    [facility] [address] [facility] [address] [address] [phone]      Dialysis/Infusion     No service has been selected for the patient.      Home Medical Care     No service has been selected for the patient.      Social Care     No service has been selected for the patient.             Final Discharge Disposition Code: 01 - home or self-care

## 2018-09-20 NOTE — DISCHARGE SUMMARY
"       Name: Kathleen William  Age: 63 y.o.  Sex: female  :  1955  MRN: [id number]         Primary Care Physician: Amairani Moura MD      Date of Admission:  2018  Date of Discharge:  2018      Chief Complaint:  Shortness of Breath      Presenting Problem/History of Present Illness:  COPD exacerbation (CMS/[facility]) [J44.1]     Discharge Diagnosis:  Active Hospital Problems    Diagnosis Date Noted   • **COPD exacerbation (CMS/HCC) [J44.1] 2018   • Acute respiratory failure with hypoxia (CMS/[facility]) [J96.01] 2018   • Acute bronchitis due to Rhinovirus [J20.6] 2018   • Abnormal LFTs (liver function tests) [R79.89] 2016   • Hypothyroidism [E03.9] 08/10/2016   • Hyperglycemia [R73.9] 08/10/2016   • Hyperlipidemia [E78.5] 08/10/2016      Resolved Hospital Problems    Diagnosis Date Noted Date Resolved   No resolved problems to display.       Secondary Diagnoses:  Past Medical History:   Diagnosis Date   • Back pain    • Disease of thyroid gland    • Edema     HAND   • Hyperlipidemia    • Neck pain        Consults:  Consult Orders (all)     Start     Ordered    18 1139  Inpatient Pulmonology Consult     Specialty:  Pulmonary Disease  Provider:  Peewee Murphy MD    18 1139          Procedures Performed:  None      Hospital Course:  Ms. William is a 63 y.o. smoker with a history of \"emphysema\" who presented to [facility] initially complaining of SOA and cough. Please see the admitting history and physical for further details. She was found to have AECOPD and was admitted to the hospital for further evaluation and treatment. She tested positive for rhinovirus. She is moderately hypoxic and treated for COPD. Pulmonology was asked to evaluate. Wheezing improved and was taken off IV steroids. No sign of bacterial infection. Still hypoxic with exertion and will be discharged home today on oxygen therapy. She'll follow-up with pulmonology for pulmonary function " studies and outpatient sleep study. She was told to stop smoking.        Physical Exam:  Temp:  [97 °F (36.1 °C)-97.7 °F (36.5 °C)] 97.6 °F (36.4 °C)  Heart Rate:  [62-89] 72  Resp:  [18-20] 20  BP: (120-142)/(66-79) 129/67  Body mass index is 27.46 kg/m².  Physical Exam   Constitutional: She is oriented to person, place, and time. No distress.   Cardiovascular: Normal rate and regular rhythm.    No murmur heard.  Pulmonary/Chest: Effort normal and breath sounds normal.   Abdominal: Soft. Bowel sounds are normal. She exhibits no distension. There is no tenderness.   Musculoskeletal: Normal range of motion. She exhibits no edema.   Neurological: She is alert and oriented to person, place, and time.   Skin: Skin is warm and dry. She is not diaphoretic.       Condition on Discharge:  Stable.    Discharge Disposition:   Home with family    Allergies:   Allergies   Allergen Reactions   • Codeine Itching       Discharge Medications:      Your medication list      START taking these medications      Instructions Last Dose Given Next Dose Due   albuterol 108 (90 Base) MCG/ACT inhaler  Commonly known as:  PROVENTIL HFA;VENTOLIN HFA      Inhale 2 puffs Every 4 (Four) Hours As Needed for Wheezing for up to 30 days.       budesonide-formoterol 160-4.5 MCG/ACT inhaler  Commonly known as:  SYMBICORT      Inhale 2 puffs 2 (Two) Times a Day.       predniSONE 50 MG tablet  Commonly known as:  DELTASONE      Take 1 tablet by mouth Daily With Breakfast for 3 doses.       zolpidem 5 MG tablet  Commonly known as:  AMBIEN      Bring to sleep lab DO NOT use at home. PLEASE take if not asleep within 30 mins of start of study.          CONTINUE taking these medications      Instructions Last Dose Given Next Dose Due   rosuvastatin 40 MG tablet  Commonly known as:  CRESTOR      Take 1 tablet by mouth Daily.       levothyroxine 150 MCG tablet  Commonly known as:  SYNTHROID, LEVOTHROID      Take 150 mcg by mouth Daily.       varenicline 0.5 MG  X 11 & 1 MG X 42 tablet  Commonly known as:  CHANTIX STARTING MONTH PRAKASH      Take 0.5 mg one daily on days 1-2 and 0.5 mg twice daily on days 4-7. Then 1 mg twice daily for a total of 12 weeks.          STOP taking these medications    ibuprofen 800 MG tablet  Commonly known as:  ADVIL,MOTRIN              Where to Get Your Medications      These medications were sent to [facility]/pharmacy #[id number] - [place], [place] - [address] [address]. AT COR[address] OF [facility] - [phone]  - [phone]   [address] [address] [place] [id number]    Phone:  [phone]   · albuterol 108 (90 Base) MCG/ACT inhaler  · budesonide-formoterol 160-4.5 MCG/ACT inhaler  · predniSONE 50 MG tablet  · zolpidem 5 MG tablet         Discharge Diet:   Diet Instructions     Diet: Regular       Discharge Diet:  Regular          Activity at Discharge:   Activity Instructions     Activity as Tolerated       Additional Activity Instructions:      Use home oxygen during night hours and with activity for 3-4 weeks per Dr. Murphy                      Follow-up Appointments:  Future Appointments  Date Time Provider Department Center   10/14/2018 8:30 PM [facility] SLEEP ROOMS [facility] SLEEP [facility]     Additional Instructions for the Follow-ups that You Need to Schedule     Follow-up with Dr. Murphy's office in 2-3 weeks.  Pulmonary function test and Polysomnography will be needed.  Call to arrange this visit and test.      [facility], [place] [address]   P: [phone]  F: [phone]              Contact information for follow-up providers     Sheila Pressley APRN. Schedule an appointment as soon as possible for a visit in 1 month(s).    Specialties:  Family Medicine, Sleep Medicine  Why:  pft's on arrival; exercise oximetry on RA on arrival  (Dr. Murphy nurse practitioner)  Contact information:  [address] [place], [place] [address] [phone]             Amairani Moura MD Follow up in 2 month(s).     Specialty:  Family Medicine  Contact information:  [address]  [address]  [facility] [address]  [phone]                   Contact information for after-discharge care     Durable Medical Equipment     [facility] .    Specialty:  DME Services  Contact information:  [id number]  [facility] [id number] [phone]                           Additional Instructions for the Follow-ups that You Need to Schedule     Follow-up with Dr. Murphy's office in 2-3 weeks.  Pulmonary function test and Polysomnography will be needed.  Call to arrange this visit and test.      [facility], [facility]  [address] [place] [address]  [address]  [place]  [address]   P: [phone]  F: [phone]                 Test Results Pending at Discharge:  None     Code status:   Code Status and Medical Interventions:   Ordered at: 09/17/18 2145     Code Status:    CPR     Medical Interventions (Level of Support Prior to Arrest):    Full         Jean Carlos Swanson MD  [facility]  09/20/18  6:18 PM

## 2018-09-20 NOTE — DISCHARGE INSTR - LAB
Follow-up with Dr. Murphy's office in 2-3 weeks.  Pulmonary function test and Polysomnography will be needed.  Call to arrange this visit and test.      [facility], [address]  [address]   P: [phone]  F: [phone]

## 2018-09-20 NOTE — NURSING NOTE
6 minute walk,    I took the patient for a six minute walk while on room air.  We walked for a continuous six minutes without rest, and oxygen percentage was monitored every minute per a portable pulse-ox.      minute   1 90% RA  minute   2 89% RA  minute   3  88% RA  minute   4  89% RA  minute   5  90% RA  minute   6 90% RA     Extra minute was completed with 2L and she maintained a 91% on 2L.

## 2018-09-20 NOTE — PROGRESS NOTES
[facility]  Phone: [phone]  Peewee Murphy MD    Subjective:  LOS: 3    She is feeling better. Still gets SOA with exertion.    Objective   Vital Signs past 24hrs    Temp range: Temp (24hrs), Av.6 °F (36.4 °C), Min:97.5 °F (36.4 °C), Max:97.7 °F (36.5 °C)    BP range: BP: (120-142)/(61-71) 120/66  Pulse range: Heart Rate:  [62-96] 69  Resp rate range: Resp:  [16-20] 20    Device (Oxygen Therapy): humidifiedFlow (L/min):  [1-2] 2  Oxygen range:SpO2:  [83 %-98 %] 94 %      70.3 kg (155 lb); Body mass index is 27.46 kg/m².    Intake/Output Summary (Last 24 hours) at 18 1031  Last data filed at 18 0803   Gross per 24 hour   Intake             1010 ml   Output              501 ml   Net              509 ml       Physical Exam   Cardiovascular: Normal rate and regular rhythm.    No murmur heard.  Pulmonary/Chest: Effort normal. No respiratory distress. She has decreased breath sounds. She has no wheezes. She has no rales.   Abdominal: Soft. Bowel sounds are normal. She exhibits no mass. There is no tenderness.   Musculoskeletal: She exhibits no edema.   Skin: No rash noted.     Results Review:    I have reviewed the laboratory and imaging data since the last note by [facility] physician.  My annotations are noted in assessment and plan.    Medication Review:  I have reviewed the current MAR.  My annotations are noted in assessment and plan.       Plan   PCCM Problems  Acute rhinovirus infection  Exacerbation of COPD  Acute hypoxic respiratory failure  Current smoker  Suspected obstructive sleep apnea  Hypersomnolence, snoring  Obesity    Plan of Treatment  Improved. Can finish out 5 days of prednisone. Give Symbicort on dc.    She will require O2 on dc and possibly can come off in 2-4 weeks.    Needs outpatient fu with pft's and for psg. Arrange fu in my office.    Peewee Murphy MD  18  10:31 AM    Part of this note may be an electronic transcription/translation of spoken language to printed  text using the Dragon Dictation System.

## 2018-09-20 NOTE — PLAN OF CARE
Problem: Patient Care Overview  Goal: Plan of Care Review  Outcome: Ongoing (interventions implemented as appropriate)   09/20/18 0207   Coping/Psychosocial   Plan of Care Reviewed With patient   Plan of Care Review   Progress improving   OTHER   Outcome Summary Pt c/o difficulty sleeping. Hycodan 10 given before bed. 2LO2 in place. VSS. afebrile. No other significant changes through the night.      Goal: Individualization and Mutuality  Outcome: Ongoing (interventions implemented as appropriate)    Goal: Discharge Needs Assessment  Outcome: Ongoing (interventions implemented as appropriate)    Goal: Interprofessional Rounds/Family Conf  Outcome: Ongoing (interventions implemented as appropriate)      Problem: Chronic Obstructive Pulmonary Disease (Adult)  Goal: Signs and Symptoms of Listed Potential Problems Will be Absent, Minimized or Managed (Chronic Obstructive Pulmonary Disease)  Outcome: Ongoing (interventions implemented as appropriate)

## 2018-09-21 ENCOUNTER — READMISSION MANAGEMENT (OUTPATIENT)
Dept: CALL CENTER | Facility: HOSPITAL | Age: 63
End: 2018-09-21

## 2018-09-22 NOTE — OUTREACH NOTE
Prep Survey      Responses   Facility patient discharged from?  [place]   Is patient eligible?  Yes   Discharge diagnosis  COPD exacerbation   Does the patient have one of the following disease processes/diagnoses(primary or secondary)?  COPD/Pneumonia   Does the patient have Home health ordered?  No   Is there a DME ordered?  Yes   What DME was ordered?  O2 per ed   Comments regarding appointments  pt to schedule   Prep survey completed?  Yes          Malaika Delgado RN

## 2018-09-25 ENCOUNTER — READMISSION MANAGEMENT (OUTPATIENT)
Dept: CALL CENTER | Facility: HOSPITAL | Age: 63
End: 2018-09-25

## 2018-09-25 NOTE — OUTREACH NOTE
COPD/PN Week 1 Survey      Responses   Facility patient discharged from?  [place]   Does the patient have one of the following disease processes/diagnoses(primary or secondary)?  COPD/Pneumonia   Is there a successful TCM telephone encounter documented?  No   Was the primary reason for admission:  COPD exacerbation   Week 1 attempt successful?  Yes   Call start time  1127   Call end time  1136   Discharge diagnosis  COPD exacerbation   List who call center can speak with  Mr. William, spouse   Person spoke with today (if not patient) and relationship  Mr William, spouse   Meds reviewed with patient/caregiver?  Yes   Is the patient having any side effects they believe may be caused by any medication additions or changes?  No   Does the patient have all medications ordered at discharge?  Yes   Is the patient taking all medications as directed (includes completed medication regime)?  Yes   Does the patient have a primary care provider?   Yes   Does the patient have an appointment with their PCP or pulmonologist within 7 days of discharge?  Yes   Comments regarding PCP  Amairani Moura MD . Appt Thurs 9/27/18.    Has the patient kept scheduled appointments due by today?  N/A   Has home health visited the patient within 72 hours of discharge?  N/A   What DME was ordered?  O2 per [facility]   Has all DME been delivered?  Yes   Psychosocial issues?  No   Did the patient receive a copy of their discharge instructions?  Yes   Nursing interventions  Reviewed instructions with patient   What is the patient's perception of their health status since discharge?  Improving   Is the patient/caregiver able to teach back the hierarchy of who to call/visit for symptoms/problems? PCP, Specialist, Home health nurse, Urgent Care, ED, 911  Yes   Is the patient able to teach back COPD zones?  Yes   Nursing interventions  -- [No zone teaching with  today. Will need to review zones with patient with next call. ]   Week 1 call  completed?  Yes          Ying Eubanks RN

## 2018-10-03 ENCOUNTER — READMISSION MANAGEMENT (OUTPATIENT)
Dept: CALL CENTER | Facility: HOSPITAL | Age: 63
End: 2018-10-03

## 2018-10-03 NOTE — OUTREACH NOTE
COPD/PN Week 2 Survey      Responses   Facility patient discharged from?  [place]   Does the patient have one of the following disease processes/diagnoses(primary or secondary)?  COPD/Pneumonia   Was the primary reason for admission:  COPD exacerbation   Week 2 attempt successful?  No   Unsuccessful attempts  Attempt 1          Jose Alfredo Carson RN

## 2018-10-06 ENCOUNTER — READMISSION MANAGEMENT (OUTPATIENT)
Dept: CALL CENTER | Facility: HOSPITAL | Age: 63
End: 2018-10-06

## 2018-10-06 NOTE — OUTREACH NOTE
COPD/PN Week 2 Survey      Responses   Facility patient discharged from?  [place]   Does the patient have one of the following disease processes/diagnoses(primary or secondary)?  COPD/Pneumonia   Was the primary reason for admission:  COPD exacerbation   Week 2 attempt successful?  Yes   Call start time  1620   Call end time  1624   Discharge diagnosis  COPD exacerbation   Meds reviewed with patient/caregiver?  Yes   Is the patient having any side effects they believe may be caused by any medication additions or changes?  No   Does the patient have all medications ordered at discharge?  Yes   Is the patient taking all medications as directed (includes completed medication regime)?  Yes   Does the patient have a primary care provider?   Yes   Does the patient have an appointment with their PCP or pulmonologist within 7 days of discharge?  Yes   Has the patient kept scheduled appointments due by today?  N/A   Comments  Has had 2 appts but no return to work yet.   Has home health visited the patient within 72 hours of discharge?  N/A   What DME was ordered?  O2 per [facility]   Has all DME been delivered?  Yes   DME comments  HS only @ 2L   Psychosocial issues?  No   Did the patient receive a copy of their discharge instructions?  Yes   Nursing interventions  Reviewed instructions with patient   What is the patient's perception of their health status since discharge?  Improving   Nursing Interventions  Nurse provided patient education   Are the patient's immunizations up to date?   Yes   Nursing interventions  Advised patient to discuss with provider at next visit   Is the patient/caregiver able to teach back the hierarchy of who to call/visit for symptoms/problems? PCP, Specialist, Home health nurse, Urgent Care, ED, 911  Yes   Is the patient able to teach back COPD zones?  Yes   Nursing interventions  Education provided on various zones   Patient reports what zone on this call?  Green Zone   Green Zone  Appetite is  good, Sleeping well, Usual activity and exercise level, Breathing without shortness of breath, Reports doing well   Green Zone interventions:  Take daily medications, Use oxygen as prescribed   Week 2 call completed?  Yes          Katrina Kendall RN

## 2018-10-14 ENCOUNTER — HOSPITAL ENCOUNTER (OUTPATIENT)
Dept: SLEEP MEDICINE | Facility: HOSPITAL | Age: 63
Discharge: HOME | End: 2018-10-14
Attending: INTERNAL MEDICINE | Admitting: INTERNAL MEDICINE

## 2018-10-14 DIAGNOSIS — G47.33 OSA (OBSTRUCTIVE SLEEP APNEA): ICD-10-CM

## 2018-10-14 PROCEDURE — 95810 POLYSOM 6/> YRS 4/> PARAM: CPT

## 2018-10-16 ENCOUNTER — TELEPHONE (OUTPATIENT)
Dept: SLEEP MEDICINE | Facility: HOSPITAL | Age: 63
End: 2018-10-16

## 2018-10-16 NOTE — TELEPHONE ENCOUNTER
Tech called pts home/mobile #, no answer. Tech left vm informing pt of positive study results. Pt to return call to go over study results and to confirm DME choice. [facility]

## 2018-10-17 ENCOUNTER — READMISSION MANAGEMENT (OUTPATIENT)
Dept: CALL CENTER | Facility: HOSPITAL | Age: 63
End: 2018-10-17

## 2018-10-17 ENCOUNTER — TELEPHONE (OUTPATIENT)
Dept: SLEEP MEDICINE | Facility: HOSPITAL | Age: 63
End: 2018-10-17

## 2018-10-17 NOTE — OUTREACH NOTE
COPD/PN Week 3 Survey      Responses   Facility patient discharged from?  [place]   Does the patient have one of the following disease processes/diagnoses(primary or secondary)?  COPD/Pneumonia   Was the primary reason for admission:  COPD exacerbation   Week 3 attempt successful?  No   Unsuccessful attempts  Attempt 1   Call end time  0900          Juanis Bello RN

## 2018-10-18 ENCOUNTER — TELEPHONE (OUTPATIENT)
Dept: SLEEP MEDICINE | Facility: HOSPITAL | Age: 63
End: 2018-10-18

## 2018-10-18 NOTE — TELEPHONE ENCOUNTER
Patient called in and tech went over study results. Confirmed with patient JALEN Rodriguez. Pt says currently on oxygen and was wanting to know if needed to return that to Jennifer. Tech informed pt to discuss with Dr. Murphy on upcoming visit @ [facility] but to continue using unless other wise directed. Patient is to follow up with [facility]

## 2018-10-19 ENCOUNTER — READMISSION MANAGEMENT (OUTPATIENT)
Dept: CALL CENTER | Facility: HOSPITAL | Age: 63
End: 2018-10-19

## 2018-10-19 NOTE — OUTREACH NOTE
COPD/PN Week 3 Survey      Responses   Facility patient discharged from?  [place]   Does the patient have one of the following disease processes/diagnoses(primary or secondary)?  COPD/Pneumonia   Was the primary reason for admission:  COPD exacerbation   Week 3 attempt successful?  No   Unsuccessful attempts  Attempt 2          Juanis Bello RN

## 2019-11-20 ENCOUNTER — TRANSCRIBE ORDERS (OUTPATIENT)
Dept: ADMINISTRATIVE | Facility: HOSPITAL | Age: 64
End: 2019-11-20

## 2019-11-20 DIAGNOSIS — Z12.2 ENCOUNTER FOR SCREENING FOR LUNG CANCER: Primary | ICD-10-CM

## 2020-01-17 ENCOUNTER — HOSPITAL ENCOUNTER (OUTPATIENT)
Dept: CT IMAGING | Facility: HOSPITAL | Age: 65
Discharge: HOME | End: 2020-01-17
Admitting: INTERNAL MEDICINE

## 2020-01-17 DIAGNOSIS — Z12.2 ENCOUNTER FOR SCREENING FOR LUNG CANCER: ICD-10-CM

## 2020-01-17 PROCEDURE — G0297 LDCT FOR LUNG CA SCREEN: HCPCS
